# Patient Record
Sex: FEMALE | Race: WHITE | NOT HISPANIC OR LATINO | Employment: FULL TIME | URBAN - NONMETROPOLITAN AREA
[De-identification: names, ages, dates, MRNs, and addresses within clinical notes are randomized per-mention and may not be internally consistent; named-entity substitution may affect disease eponyms.]

---

## 2017-03-15 ENCOUNTER — HOSPITAL ENCOUNTER (EMERGENCY)
Facility: HOSPITAL | Age: 42
Discharge: HOME | End: 2017-03-15
Attending: FAMILY MEDICINE | Admitting: FAMILY MEDICINE

## 2017-03-15 VITALS
HEIGHT: 66 IN | RESPIRATION RATE: 18 BRPM | OXYGEN SATURATION: 97 % | TEMPERATURE: 97.9 F | BODY MASS INDEX: 40.18 KG/M2 | SYSTOLIC BLOOD PRESSURE: 111 MMHG | WEIGHT: 250 LBS | HEART RATE: 82 BPM | DIASTOLIC BLOOD PRESSURE: 75 MMHG

## 2017-03-15 DIAGNOSIS — G43.909 MIGRAINE WITHOUT STATUS MIGRAINOSUS, NOT INTRACTABLE, UNSPECIFIED MIGRAINE TYPE: Primary | ICD-10-CM

## 2017-03-15 DIAGNOSIS — R10.11 RIGHT UPPER QUADRANT ABDOMINAL PAIN: ICD-10-CM

## 2017-03-15 LAB
ALBUMIN SERPL-MCNC: 4.3 G/DL (ref 3.4–4.8)
ALBUMIN/GLOB SERPL: 1.3 G/DL (ref 1.1–1.8)
ALP SERPL-CCNC: 129 U/L (ref 38–126)
ALT SERPL W P-5'-P-CCNC: 103 U/L (ref 9–52)
ANION GAP SERPL CALCULATED.3IONS-SCNC: 11 MMOL/L (ref 5–15)
AST SERPL-CCNC: 151 U/L (ref 14–36)
BILIRUB SERPL-MCNC: 0.8 MG/DL (ref 0.2–1.3)
BUN BLD-MCNC: 10 MG/DL (ref 7–21)
BUN/CREAT SERPL: 25.6 (ref 7–25)
CALCIUM SPEC-SCNC: 8.8 MG/DL (ref 8.4–10.2)
CHLORIDE SERPL-SCNC: 99 MMOL/L (ref 95–110)
CO2 SERPL-SCNC: 23 MMOL/L (ref 22–31)
CREAT BLD-MCNC: 0.39 MG/DL (ref 0.5–1)
GFR SERPL CREATININE-BSD FRML MDRD: 183 ML/MIN/1.73 (ref 64–149)
GLOBULIN UR ELPH-MCNC: 3.4 GM/DL (ref 2.3–3.5)
GLUCOSE BLD-MCNC: 169 MG/DL (ref 60–100)
POTASSIUM BLD-SCNC: 4.2 MMOL/L (ref 3.5–5.1)
PROT SERPL-MCNC: 7.7 G/DL (ref 6.3–8.6)
SODIUM BLD-SCNC: 133 MMOL/L (ref 137–145)

## 2017-03-15 PROCEDURE — 96374 THER/PROPH/DIAG INJ IV PUSH: CPT

## 2017-03-15 PROCEDURE — 25010000002 KETOROLAC TROMETHAMINE PER 15 MG: Performed by: FAMILY MEDICINE

## 2017-03-15 PROCEDURE — 80053 COMPREHEN METABOLIC PANEL: CPT | Performed by: FAMILY MEDICINE

## 2017-03-15 PROCEDURE — 99283 EMERGENCY DEPT VISIT LOW MDM: CPT

## 2017-03-15 PROCEDURE — 96375 TX/PRO/DX INJ NEW DRUG ADDON: CPT

## 2017-03-15 PROCEDURE — 25010000002 PROMETHAZINE PER 50 MG: Performed by: FAMILY MEDICINE

## 2017-03-15 RX ORDER — PROMETHAZINE HYDROCHLORIDE 25 MG/ML
12.5 INJECTION, SOLUTION INTRAMUSCULAR; INTRAVENOUS ONCE
Status: COMPLETED | OUTPATIENT
Start: 2017-03-15 | End: 2017-03-15

## 2017-03-15 RX ORDER — SODIUM CHLORIDE 0.9 % (FLUSH) 0.9 %
10 SYRINGE (ML) INJECTION AS NEEDED
Status: DISCONTINUED | OUTPATIENT
Start: 2017-03-15 | End: 2017-03-16 | Stop reason: HOSPADM

## 2017-03-15 RX ORDER — KETOROLAC TROMETHAMINE 30 MG/ML
30 INJECTION, SOLUTION INTRAMUSCULAR; INTRAVENOUS ONCE
Status: COMPLETED | OUTPATIENT
Start: 2017-03-15 | End: 2017-03-15

## 2017-03-15 RX ORDER — SUMATRIPTAN 50 MG/1
50 TABLET, FILM COATED ORAL
COMMUNITY
End: 2017-12-20

## 2017-03-15 RX ORDER — ONDANSETRON 4 MG/1
4 TABLET, FILM COATED ORAL EVERY 8 HOURS PRN
Qty: 10 TABLET | Refills: 0 | Status: SHIPPED | OUTPATIENT
Start: 2017-03-15 | End: 2017-09-25

## 2017-03-15 RX ORDER — TRAMADOL HYDROCHLORIDE 50 MG/1
50 TABLET ORAL EVERY 8 HOURS PRN
Qty: 15 TABLET | Refills: 0 | Status: SHIPPED | OUTPATIENT
Start: 2017-03-15 | End: 2017-09-25

## 2017-03-15 RX ADMIN — PROMETHAZINE HYDROCHLORIDE 12.5 MG: 25 INJECTION, SOLUTION INTRAMUSCULAR; INTRAVENOUS at 19:43

## 2017-03-15 RX ADMIN — KETOROLAC TROMETHAMINE 30 MG: 30 INJECTION, SOLUTION INTRAMUSCULAR; INTRAVENOUS at 19:43

## 2017-03-15 NOTE — ED PROVIDER NOTES
Subjective   HPI Comments: Stain remover used at NH caused patient to develop nausea, headache, and abdominal pain. Pt states that she has h/o migraines.    Patient is a 41 y.o. female presenting with migraines.   Migraine   Pain location:  Generalized  Quality:  Sharp  Radiates to:  Does not radiate  Severity currently:  8/10  Severity at highest:  8/10  Onset quality:  Sudden  Duration:  16 hours  Timing:  Constant  Progression:  Waxing and waning  Chronicity:  New  Context comment:  Exposure to cleanser  Relieved by:  Nothing  Worsened by:  Nothing  Associated symptoms: abdominal pain, fatigue, nausea, near-syncope, neck stiffness, seizures, vomiting and weakness (generalized)    Associated symptoms: no back pain, no blurred vision, no congestion, no cough, no diarrhea, no dizziness, no drainage, no ear pain, no facial pain, no fever, no hearing loss, no loss of balance, no myalgias, no neck pain, no numbness, no sinus pressure, no sore throat, no swollen glands, no syncope, no tingling and no URI        Review of Systems   Constitutional: Positive for fatigue. Negative for appetite change, chills, diaphoresis and fever.   HENT: Negative for congestion, ear discharge, ear pain, hearing loss, nosebleeds, postnasal drip, rhinorrhea, sinus pressure, sore throat and trouble swallowing.    Eyes: Negative for blurred vision, discharge and redness.   Respiratory: Negative for apnea, cough, chest tightness, shortness of breath and wheezing.    Cardiovascular: Positive for near-syncope. Negative for chest pain and syncope.   Gastrointestinal: Positive for abdominal pain, nausea and vomiting. Negative for diarrhea.   Endocrine: Negative for polyuria.   Genitourinary: Negative for dysuria, frequency and urgency.   Musculoskeletal: Positive for neck stiffness. Negative for back pain, myalgias and neck pain.   Skin: Negative for color change and rash.   Allergic/Immunologic: Negative for immunocompromised state.    Neurological: Positive for seizures and weakness (generalized). Negative for dizziness, syncope, light-headedness, numbness, headaches and loss of balance.   Hematological: Negative for adenopathy. Does not bruise/bleed easily.   Psychiatric/Behavioral: Negative for behavioral problems and confusion.   All other systems reviewed and are negative.      Past Medical History   Diagnosis Date   • Fatty liver    • Migraine        Allergies   Allergen Reactions   • Caffeine        Past Surgical History   Procedure Laterality Date   • Cholecystectomy         History reviewed. No pertinent family history.    Social History     Social History   • Marital status:      Spouse name: N/A   • Number of children: N/A   • Years of education: N/A     Social History Main Topics   • Smoking status: Former Smoker   • Smokeless tobacco: None   • Alcohol use No   • Drug use: No   • Sexual activity: Defer     Other Topics Concern   • None     Social History Narrative   • None           Objective   Physical Exam   Constitutional: She is oriented to person, place, and time. She appears well-developed and well-nourished.   HENT:   Head: Normocephalic and atraumatic.   Nose: Nose normal.   Mouth/Throat: Oropharynx is clear and moist.   Eyes: Conjunctivae and EOM are normal. Pupils are equal, round, and reactive to light. Right eye exhibits no discharge. Left eye exhibits no discharge. No scleral icterus.   Neck: Normal range of motion. Neck supple. No tracheal deviation present.   Cardiovascular: Normal rate, regular rhythm and normal heart sounds.    No murmur heard.  Pulmonary/Chest: Effort normal and breath sounds normal. No stridor. No respiratory distress. She has no wheezes. She has no rales.   Abdominal: Soft. Bowel sounds are normal. She exhibits no distension and no mass. There is tenderness in the right upper quadrant. There is no rebound and no guarding.   Musculoskeletal: She exhibits no edema.   Neurological: She is  alert and oriented to person, place, and time. Coordination normal.   Skin: Skin is warm and dry. No rash noted. No erythema.   Psychiatric: She has a normal mood and affect. Her behavior is normal. Thought content normal.   Nursing note and vitals reviewed.      Procedures         ED Course  ED Course   Comment By Time   Request # : [id number] Yossi Mg MD 03/15 2201          Labs Reviewed   COMPREHENSIVE METABOLIC PANEL - Abnormal; Notable for the following:        Result Value    Glucose 169 (*)     Creatinine 0.39 (*)     Sodium 133 (*)     ALT (SGPT) 103 (*)     AST (SGOT) 151 (*)     Alkaline Phosphatase 129 (*)     eGFR Non  Amer 183 (*)     BUN/Creatinine Ratio 25.6 (*)     All other components within normal limits       No orders to display     Labs reviewed with patient, LFTs at baseline.            MDM    Final diagnoses:   Migraine without status migrainosus, not intractable, unspecified migraine type   Right upper quadrant abdominal pain            Yossi Mg MD  03/15/17 [id number]

## 2017-09-29 ENCOUNTER — TELEPHONE (OUTPATIENT)
Dept: FAMILY MEDICINE CLINIC | Facility: CLINIC | Age: 42
End: 2017-09-29

## 2018-11-09 PROCEDURE — 87480 CANDIDA DNA DIR PROBE: CPT | Performed by: NURSE PRACTITIONER

## 2018-11-09 PROCEDURE — 87660 TRICHOMONAS VAGIN DIR PROBE: CPT | Performed by: NURSE PRACTITIONER

## 2018-11-09 PROCEDURE — 87510 GARDNER VAG DNA DIR PROBE: CPT | Performed by: NURSE PRACTITIONER

## 2018-11-09 PROCEDURE — 87086 URINE CULTURE/COLONY COUNT: CPT | Performed by: NURSE PRACTITIONER

## 2020-02-04 ENCOUNTER — OFFICE VISIT (OUTPATIENT)
Dept: ENDOCRINOLOGY | Facility: CLINIC | Age: 45
End: 2020-02-04

## 2020-02-04 VITALS
SYSTOLIC BLOOD PRESSURE: 124 MMHG | OXYGEN SATURATION: 98 % | HEIGHT: 66 IN | BODY MASS INDEX: 39.71 KG/M2 | WEIGHT: 247.1 LBS | HEART RATE: 110 BPM | DIASTOLIC BLOOD PRESSURE: 72 MMHG

## 2020-02-04 DIAGNOSIS — E06.3 HYPOTHYROIDISM DUE TO HASHIMOTO'S THYROIDITIS: ICD-10-CM

## 2020-02-04 DIAGNOSIS — E11.59 HYPERTENSION ASSOCIATED WITH DIABETES (HCC): ICD-10-CM

## 2020-02-04 DIAGNOSIS — E11.69 MIXED DIABETIC HYPERLIPIDEMIA ASSOCIATED WITH TYPE 2 DIABETES MELLITUS (HCC): ICD-10-CM

## 2020-02-04 DIAGNOSIS — E78.2 MIXED DIABETIC HYPERLIPIDEMIA ASSOCIATED WITH TYPE 2 DIABETES MELLITUS (HCC): ICD-10-CM

## 2020-02-04 DIAGNOSIS — I15.2 HYPERTENSION ASSOCIATED WITH DIABETES (HCC): ICD-10-CM

## 2020-02-04 DIAGNOSIS — Z79.4 TYPE 2 DIABETES MELLITUS WITH HYPERGLYCEMIA, WITH LONG-TERM CURRENT USE OF INSULIN (HCC): Primary | ICD-10-CM

## 2020-02-04 DIAGNOSIS — E03.8 HYPOTHYROIDISM DUE TO HASHIMOTO'S THYROIDITIS: ICD-10-CM

## 2020-02-04 DIAGNOSIS — E11.65 TYPE 2 DIABETES MELLITUS WITH HYPERGLYCEMIA, WITH LONG-TERM CURRENT USE OF INSULIN (HCC): Primary | ICD-10-CM

## 2020-02-04 PROCEDURE — 99204 OFFICE O/P NEW MOD 45 MIN: CPT | Performed by: INTERNAL MEDICINE

## 2020-02-04 RX ORDER — ATORVASTATIN CALCIUM 40 MG/1
40 TABLET, FILM COATED ORAL DAILY
COMMUNITY

## 2020-02-04 RX ORDER — VITAMIN E 268 MG
400 CAPSULE ORAL DAILY
COMMUNITY

## 2020-02-04 RX ORDER — INSULIN GLARGINE 100 [IU]/ML
INJECTION, SOLUTION SUBCUTANEOUS
Qty: 2 PEN | Refills: 11 | Status: SHIPPED | OUTPATIENT
Start: 2020-02-04 | End: 2020-07-28 | Stop reason: SDUPTHER

## 2020-02-04 NOTE — PROGRESS NOTES
" Diane Esquivel is a 44 y.o. female who presents for  evaluation of   Chief Complaint   Patient presents with   • Diabetes     bs 175 this am pizza last night       Referring provider    Primary Care Provider    Mushtaq Mar MD    Duration 10 years    Timing - Diabetes is Constant    Quality -  reasonably well controlled    Severity -  moderate    Complications - none    Current symptoms/problems  none     Alleviating Factors: Compliance      Side Effects  none    Current diet  in general, a \"healthy\" diet      Current exercise none    Current monitoring regimen: home blood tests - checking 2 x daily   Home blood sugar records: 100-200    Hypoglycemia none    Past Medical History:   Diagnosis Date   • Abnormal liver enzymes    • Acute serous otitis media    • Adult body mass index 30+     obesity   • Dysfunctional uterine bleeding    • Fatty liver    • Hypercholesteremia    • Hypertension associated with diabetes (CMS/[facility]) 2/4/2020   • Hypothyroidism due to Hashimoto's thyroiditis 2/4/2020   • Migraine    • Migraine     tension type headache vs migraine      • Mixed diabetic hyperlipidemia associated with type 2 diabetes mellitus (CMS/[facility]) 2/4/2020   • Morbid (severe) obesity due to excess calories (CMS/[facility])     BMI 41   • Nausea, vomiting and diarrhea    • Non-alcoholic fatty liver disease    • Type 2 diabetes mellitus (CMS/[facility])     A1C 7.4   • Type 2 diabetes mellitus with hyperglycemia, with long-term current use of insulin (CMS/[facility]) 2/4/2020     No family history on file.  Social History     Tobacco Use   • Smoking status: Never Smoker   • Smokeless tobacco: Never Used   Substance Use Topics   • Alcohol use: No   • Drug use: No         Current Outpatient Medications:   •  atorvastatin (LIPITOR) 40 MG tablet, Take 40 mg by mouth Daily., Disp: , Rfl:   •  Dulaglutide (TRULICITY) 0.75 MG/0.5ML solution pen-injector, Inject 0.75 mg under the skin into the appropriate area as directed Every 7 (Seven) Days., " Disp: 4 pen, Rfl: 11  •  levothyroxine (SYNTHROID, LEVOTHROID) 50 MCG tablet, 75 mcg., Disp: , Rfl:   •  ondansetron ODT (ZOFRAN-ODT) 4 MG disintegrating tablet, Take 1 tablet by mouth Every 8 (Eight) Hours As Needed for Nausea or Vomiting for up to 8 doses., Disp: 8 tablet, Rfl: 0  •  ONE TOUCH ULTRA TEST test strip, , Disp: , Rfl:   •  vitamin E 400 UNIT capsule, Take 400 Units by mouth Daily., Disp: , Rfl:   •  Empagliflozin-metFORMIN HCl ER (SYNJARDY XR) 5-1000 MG tablet sustained-release 24 hour, Take 2 tablet/day by mouth Daily With Breakfast., Disp: 60 tablet, Rfl: 11  •  Insulin Glargine (BASAGLAR KWIKPEN) 100 UNIT/ML injection pen, 20 units qhs, Disp: 2 pen, Rfl: 11    Review of Systems    Review of Systems   Constitutional: Negative for activity change, appetite change, chills, diaphoresis, fatigue, fever and unexpected weight change.   HENT: Negative for congestion, dental problem, drooling, ear discharge, ear pain, facial swelling, mouth sores, postnasal drip, rhinorrhea, sinus pressure, sore throat, tinnitus, trouble swallowing and voice change.    Eyes: Negative for photophobia, pain, discharge, redness, itching and visual disturbance.   Respiratory: Negative for apnea, cough, choking, chest tightness, shortness of breath, wheezing and stridor.    Cardiovascular: Negative for chest pain, palpitations and leg swelling.   Gastrointestinal: Negative for abdominal distention, abdominal pain, constipation, diarrhea, nausea and vomiting.   Endocrine: Negative for cold intolerance, heat intolerance, polydipsia, polyphagia and polyuria.   Genitourinary: Negative for decreased urine volume, difficulty urinating, dysuria, flank pain, frequency, hematuria and urgency.   Musculoskeletal: Negative for arthralgias, back pain, gait problem, joint swelling, myalgias, neck pain and neck stiffness.   Skin: Negative for color change, pallor, rash and wound.   Allergic/Immunologic: Negative for immunocompromised state.  "  Neurological: Negative for dizziness, tremors, seizures, syncope, facial asymmetry, speech difficulty, weakness, light-headedness, numbness and headaches.   Hematological: Negative for adenopathy.   Psychiatric/Behavioral: Negative for agitation, behavioral problems, confusion, decreased concentration, dysphoric mood, hallucinations, self-injury, sleep disturbance and suicidal ideas. The patient is not nervous/anxious and is not hyperactive.         Objective:   /72   Pulse 110   Ht 167.6 cm (66\")   Wt 112 kg (247 lb 1.6 oz)   SpO2 98%   BMI 39.88 kg/m²     Physical Exam   Constitutional: She is oriented to person, place, and time. She appears well-developed.   HENT:   Head: Normocephalic.   Right Ear: External ear normal.   Left Ear: External ear normal.   Nose: Nose normal.   Eyes: Conjunctivae and EOM are normal. No scleral icterus.   Neck: Normal range of motion. Neck supple. No tracheal deviation present. No thyromegaly present.   Cardiovascular: Normal rate, regular rhythm, normal heart sounds and intact distal pulses. Exam reveals no gallop and no friction rub.   No murmur heard.  Pulmonary/Chest: Effort normal and breath sounds normal. No stridor. No respiratory distress. She has no wheezes. She has no rales. She exhibits no tenderness.   Abdominal: Soft. Bowel sounds are normal. She exhibits no distension and no mass. There is no tenderness. There is no rebound and no guarding.   Musculoskeletal: Normal range of motion. She exhibits no tenderness or deformity.   Lymphadenopathy:     She has no cervical adenopathy.   Neurological: She is alert and oriented to person, place, and time. She displays normal reflexes. She exhibits normal muscle tone. Coordination normal.   Skin: No rash noted. No erythema. No pallor.   Psychiatric: She has a normal mood and affect. Her behavior is normal. Judgment and thought content normal.       Lab Review    Results for orders placed or performed during the " hospital encounter of 11/09/18   Gardnerella vaginalis, Trichomonas vaginalis, Candida albicans, PCR - Swab, Vagina   Result Value Ref Range    CANDIDA ALBICANS Negative     GARDNERELLA VAGINALIS Negative     Trichomonas vaginalis PCR Negative    Urine Culture - Urine, Urine, Clean Catch   Result Value Ref Range    Urine Culture No growth at 24 hours    POC Urinalysis Dipstick, Multipro (Automated dipstick)   Result Value Ref Range    Color Yellow Yellow, Straw, Dark Yellow, Marilee    Clarity, UA Cloudy (A) Clear    Glucose,  mg/dL (A) Negative, 1000 mg/dL (3+) mg/dL    Bilirubin Negative Negative    Ketones, UA Negative Negative    Specific Gravity  1.030 1.005 - 1.030    Blood, UA 1+ (A) Negative    pH, Urine 6.0 5.0 - 8.0    Protein, POC Trace (A) Negative mg/dL    Urobilinogen, UA Normal Normal    Nitrite, UA Negative Negative    Leukocytes Negative Negative   POCT Urine Micro   Result Value Ref Range    WBC, UA 0-2 (A) None Seen /HPF    RBC, UA 3-5 (A) None Seen /HPF    Bacteria, UA Trace (A) None Seen /HPF         Assessment/Plan       ICD-10-CM ICD-9-CM   1. Type 2 diabetes mellitus with hyperglycemia, with long-term current use of insulin (CMS/[facility]) E11.65 250.00    Z79.4 790.29     V58.67   2. Hypertension associated with diabetes (CMS/[facility]) E11.59 250.80    I10 401.9   3. Mixed diabetic hyperlipidemia associated with type 2 diabetes mellitus (CMS/[facility]) E11.69 250.80    E78.2 272.2   4. Hypothyroidism due to Hashimoto's thyroiditis E03.8 244.8    E06.3 245.2         I reviewed and summarized records from Mushtaq Mar MD from current year  and I reviewed / ordered labs.   From review of records :    Pt has type 2 diabetes with hyperglycemia      Glycemic Management:   Lab Results   Component Value Date    HGBA1C 7.4 (H) 01/11/2016     Lab Results   Component Value Date    GLUCOSE 169 (H) 03/15/2017    BUN 10 03/15/2017    CREATININE 0.39 (L) 03/15/2017    EGFRIFNONA 183 (H) 03/15/2017    BCR 25.6 (H)  03/15/2017    K 4.2 03/15/2017    CO2 23.0 03/15/2017    CALCIUM 8.8 03/15/2017    ALBUMIN 4.30 03/15/2017     (H) 03/15/2017     (H) 03/15/2017    ANIONGAP 11.0 03/15/2017     Lab Results   Component Value Date    WBC 7.7 01/11/2016    HGB 12.5 01/11/2016    HCT 37.2 01/11/2016    MCV 83.8 01/11/2016     01/11/2016     basaglar 15 -- decrease to 10 ( gave toujeo samples )    Keep trulicity 0.75 , consider increasing but nauseous , has migraines that aggravates nausea    Add synjardy    Stop glipizide and januiva       Lipid Management  No results found for: CHOL  Lab Results   Component Value Date    TRIG 121 01/11/2016     Lab Results   Component Value Date    HDL 38 (L) 01/11/2016     No components found for: LDLCALC  Lab Results   Component Value Date     (H) 01/11/2016     No results found for: LDLDIRECT    lipitor 40 mg qhs      Blood Pressure Management:    Vitals:    02/04/20 0900   BP: 124/72   Pulse: 110   SpO2: 98%     Lab Results   Component Value Date    GLUCOSE 169 (H) 03/15/2017    CALCIUM 8.8 03/15/2017     (L) 03/15/2017    K 4.2 03/15/2017    CO2 23.0 03/15/2017    CL 99 03/15/2017    BUN 10 03/15/2017    CREATININE 0.39 (L) 03/15/2017    EGFRIFNONA 183 (H) 03/15/2017    BCR 25.6 (H) 03/15/2017    ANIONGAP 11.0 03/15/2017     At goal           Microvascular Complication Monitoring:      Eye Exam Evaluation    -----------    Last Microalbumin-Proteinuria Assessment    No results found for: MALBCRERATIO    No results found for: UTPCR    -----------      Neuropathy      Immunizations:          Preventive Care:        Weight Related:   Wt Readings from Last 3 Encounters:   02/04/20 112 kg (247 lb 1.6 oz)   11/09/18 114 kg (251 lb 6.4 oz)   08/18/18 111 kg (245 lb)     Body mass index is 39.88 kg/m².        Diet interventions: moderate (500 kCal/d) deficit diet.      Bone Health    Lab Results   Component Value Date    CALCIUM 8.8 03/15/2017    [id number] 16 (L)  02/24/2014       Thyroid Health    Lab Results   Component Value Date    TSH 5.01 (H) 01/11/2016    TSH 7.13 (H) 02/24/2014       Lab Results   Component Value Date    FREET4 0.70 (L) 01/11/2016    FREET4 0.79 02/24/2014       On levothyroxine 100           Other Diabetes Related Aspects       No results found for: [id number]         No orders of the defined types were placed in this encounter.        A copy of my note was sent to Mushtaq Mar MD    Please see my above opinion and suggestions.

## 2020-02-05 ENCOUNTER — TELEPHONE (OUTPATIENT)
Dept: ENDOCRINOLOGY | Facility: CLINIC | Age: 45
End: 2020-02-05

## 2020-02-05 ENCOUNTER — APPOINTMENT (OUTPATIENT)
Dept: CT IMAGING | Facility: HOSPITAL | Age: 45
End: 2020-02-05

## 2020-02-05 NOTE — TELEPHONE ENCOUNTER
RICO FERGUSON Key: [id number] - Rx #: [id number] Need help? Call us at [phone]   Status   Sent to [facility] XR 5-1000MG er tablets   Form[employer] Electronic PA Form (NCPDP)   Original Claim [id number]

## 2020-02-06 ENCOUNTER — TELEPHONE (OUTPATIENT)
Dept: ENDOCRINOLOGY | Facility: CLINIC | Age: 45
End: 2020-02-06

## 2020-03-03 ENCOUNTER — HOSPITAL ENCOUNTER (OUTPATIENT)
Dept: CT IMAGING | Facility: HOSPITAL | Age: 45
Discharge: HOME | End: 2020-03-03
Admitting: FAMILY MEDICINE

## 2020-03-03 DIAGNOSIS — G43.009 MIGRAINE WITHOUT AURA AND WITHOUT STATUS MIGRAINOSUS, NOT INTRACTABLE: ICD-10-CM

## 2020-03-03 PROCEDURE — 70470 CT HEAD/BRAIN W/O & W/DYE: CPT

## 2020-03-03 PROCEDURE — 25010000002 IOPAMIDOL 61 % SOLUTION: Performed by: FAMILY MEDICINE

## 2020-03-03 RX ADMIN — IOPAMIDOL 90 ML: 612 INJECTION, SOLUTION INTRAVENOUS at 09:46

## 2020-03-16 ENCOUNTER — OFFICE VISIT (OUTPATIENT)
Dept: ENDOCRINOLOGY | Facility: CLINIC | Age: 45
End: 2020-03-16

## 2020-03-16 VITALS
BODY MASS INDEX: 40.45 KG/M2 | OXYGEN SATURATION: 100 % | HEIGHT: 66 IN | SYSTOLIC BLOOD PRESSURE: 124 MMHG | HEART RATE: 85 BPM | DIASTOLIC BLOOD PRESSURE: 74 MMHG | WEIGHT: 251.7 LBS

## 2020-03-16 DIAGNOSIS — E03.8 HYPOTHYROIDISM DUE TO HASHIMOTO'S THYROIDITIS: ICD-10-CM

## 2020-03-16 DIAGNOSIS — E11.65 TYPE 2 DIABETES MELLITUS WITH HYPERGLYCEMIA, WITH LONG-TERM CURRENT USE OF INSULIN (HCC): ICD-10-CM

## 2020-03-16 DIAGNOSIS — E11.69 MIXED DIABETIC HYPERLIPIDEMIA ASSOCIATED WITH TYPE 2 DIABETES MELLITUS (HCC): ICD-10-CM

## 2020-03-16 DIAGNOSIS — E78.2 MIXED DIABETIC HYPERLIPIDEMIA ASSOCIATED WITH TYPE 2 DIABETES MELLITUS (HCC): ICD-10-CM

## 2020-03-16 DIAGNOSIS — I15.2 HYPERTENSION ASSOCIATED WITH DIABETES (HCC): Primary | ICD-10-CM

## 2020-03-16 DIAGNOSIS — Z79.4 TYPE 2 DIABETES MELLITUS WITH HYPERGLYCEMIA, WITH LONG-TERM CURRENT USE OF INSULIN (HCC): ICD-10-CM

## 2020-03-16 DIAGNOSIS — E11.59 HYPERTENSION ASSOCIATED WITH DIABETES (HCC): Primary | ICD-10-CM

## 2020-03-16 DIAGNOSIS — E06.3 HYPOTHYROIDISM DUE TO HASHIMOTO'S THYROIDITIS: ICD-10-CM

## 2020-03-16 LAB — HBA1C MFR BLD: 8.9 %

## 2020-03-16 PROCEDURE — 99214 OFFICE O/P EST MOD 30 MIN: CPT | Performed by: NURSE PRACTITIONER

## 2020-03-16 RX ORDER — SUMATRIPTAN 50 MG/1
50 TABLET, FILM COATED ORAL DAILY
COMMUNITY
Start: 2020-03-06

## 2020-03-16 RX ORDER — AMITRIPTYLINE HYDROCHLORIDE 50 MG/1
50 TABLET, FILM COATED ORAL DAILY
COMMUNITY
Start: 2020-03-06

## 2020-03-16 RX ORDER — PIOGLITAZONEHYDROCHLORIDE 15 MG/1
15 TABLET ORAL DAILY
Qty: 30 TABLET | Refills: 11 | Status: SHIPPED | OUTPATIENT
Start: 2020-03-16 | End: 2021-03-16

## 2020-03-16 NOTE — PROGRESS NOTES
"  Subjective    Diane Esquivel is a 44 y.o. female. she is here today for follow-up.    History of Present Illness         Referring provider     Primary Care Provider     Mushtaq Mar MD    Reason - diabetes      Duration 10 years     Timing - Diabetes is Constant     Quality -  not controlled     Severity -  moderate     Complications - none     Current symptoms/problems  none      Alleviating Factors: Compliance       Side Effects  none     Current diet  in general, a \"healthy\" diet       Current exercise none     Current monitoring regimen: home blood tests - checking 2 x daily     Lab Results   Component Value Date    HGBA1C 8.9 09/17/2019       Home blood sugar records:       States 200 or higher          Hypoglycemia none             The following portions of the patient's history were reviewed and updated as appropriate:   Past Medical History:   Diagnosis Date   • Abnormal liver enzymes    • Acute serous otitis media    • Adult body mass index 30+     obesity   • Dysfunctional uterine bleeding    • Fatty liver    • Hypercholesteremia    • Hypertension associated with diabetes (CMS/HCC) 2/4/2020   • Hypothyroidism due to Hashimoto's thyroiditis 2/4/2020   • Migraine    • Migraine     tension type headache vs migraine      • Mixed diabetic hyperlipidemia associated with type 2 diabetes mellitus (CMS/[facility]) 2/4/2020   • Morbid (severe) obesity due to excess calories (CMS/[facility])     BMI 41   • Nausea, vomiting and diarrhea    • Non-alcoholic fatty liver disease    • Type 2 diabetes mellitus (CMS/[facility])     A1C 7.4   • Type 2 diabetes mellitus with hyperglycemia, with long-term current use of insulin (CMS/[facility]) 2/4/2020     Past Surgical History:   Procedure Laterality Date   • CHOLECYSTECTOMY     • INJECTION OF MEDICATION  02/18/2014    Phenergan (2)      • INJECTION OF MEDICATION  02/24/2014    Toradol (2)    • INJECTION OF MEDICATION  03/09/2014    Zofran (2)        No family history on file.  OB History  "   None       Current Outpatient Medications   Medication Sig Dispense Refill   • amitriptyline (ELAVIL) 50 MG tablet Take 50 mg by mouth Daily.     • atorvastatin (LIPITOR) 40 MG tablet Take 40 mg by mouth Daily.     • Dulaglutide (TRULICITY) 0.75 MG/0.5ML solution pen-injector Inject 0.75 mg under the skin into the appropriate area as directed Every 7 (Seven) Days. 4 pen 11   • Empagliflozin-metFORMIN HCl ER (SYNJARDY XR) 5-1000 MG tablet sustained-release 24 hour Take 2 tablet/day by mouth Daily With Breakfast. 60 tablet 11   • Insulin Glargine (BASAGLAR KWIKPEN) 100 UNIT/ML injection pen 20 units qhs 2 pen 11   • levothyroxine (SYNTHROID, LEVOTHROID) 50 MCG tablet 75 mcg.     • ondansetron ODT (ZOFRAN-ODT) 4 MG disintegrating tablet Take 1 tablet by mouth Every 8 (Eight) Hours As Needed for Nausea or Vomiting for up to 8 doses. 8 tablet 0   • ONE TOUCH ULTRA TEST test strip      • vitamin E 400 UNIT capsule Take 400 Units by mouth Daily.     • pioglitazone (ACTOS) 15 MG tablet Take 1 tablet by mouth Daily. 30 tablet 11   • SUMAtriptan (IMITREX) 50 MG tablet Take 50 mg by mouth Daily.       No current facility-administered medications for this visit.      Allergies   Allergen Reactions   • Caffeine      Social History     Socioeconomic History   • Marital status:      Spouse name: Not on file   • Number of children: Not on file   • Years of education: Not on file   • Highest education level: Not on file   Tobacco Use   • Smoking status: Never Smoker   • Smokeless tobacco: Never Used   Substance and Sexual Activity   • Alcohol use: No   • Drug use: No   • Sexual activity: Defer       Review of Systems  Review of Systems   Constitutional: Negative for activity change, appetite change, diaphoresis and fatigue.   HENT: Negative for facial swelling, sneezing, sore throat, tinnitus, trouble swallowing and voice change.    Eyes: Negative for photophobia, pain, discharge, redness, itching and visual disturbance.  "  Respiratory: Negative for apnea, cough, choking, chest tightness and shortness of breath.    Cardiovascular: Negative for chest pain, palpitations and leg swelling.   Gastrointestinal: Negative for abdominal distention, abdominal pain, constipation, diarrhea, nausea and vomiting.   Endocrine: Negative for cold intolerance, heat intolerance, polydipsia, polyphagia and polyuria.   Genitourinary: Negative for difficulty urinating, dysuria, frequency, hematuria and urgency.   Musculoskeletal: Negative for arthralgias, back pain, gait problem, joint swelling, myalgias, neck pain and neck stiffness.   Skin: Negative for color change, pallor, rash and wound.   Neurological: Negative for dizziness, tremors, weakness, light-headedness, numbness and headaches.   Hematological: Negative for adenopathy. Does not bruise/bleed easily.   Psychiatric/Behavioral: Negative for behavioral problems, confusion and sleep disturbance.        Objective    /74   Pulse 85   Ht 167.6 cm (66\")   Wt 114 kg (251 lb 11.2 oz)   SpO2 100%   BMI 40.63 kg/m²   Physical Exam   Constitutional: She is oriented to person, place, and time. She appears well-developed and well-nourished. No distress.   HENT:   Head: Normocephalic and atraumatic.   Right Ear: External ear normal.   Left Ear: External ear normal.   Nose: Nose normal.   Eyes: Pupils are equal, round, and reactive to light. Conjunctivae and EOM are normal.   Neck: Normal range of motion. Neck supple. No tracheal deviation present. No thyromegaly present.   Cardiovascular: Normal rate, regular rhythm and normal heart sounds.   No murmur heard.  Pulmonary/Chest: Effort normal and breath sounds normal. No respiratory distress. She has no wheezes.   Abdominal: Soft. Bowel sounds are normal. There is no tenderness. There is no rebound and no guarding.   Musculoskeletal: Normal range of motion. She exhibits no edema, tenderness or deformity.   Neurological: She is alert and oriented to " person, place, and time. No cranial nerve deficit.   Skin: Skin is warm and dry. No rash noted.   Psychiatric: She has a normal mood and affect. Her behavior is normal. Judgment and thought content normal.       Lab Review  Glucose   Date Value   03/15/2017 169 mg/dL (H)   01/11/2016 181 mg/dl (H)   11/30/2015 127 mg/dl (H)     Sodium (mmol/L)   Date Value   03/15/2017 133 (L)   01/11/2016 136 (L)   11/30/2015 137     Potassium (mmol/L)   Date Value   03/15/2017 4.2   01/11/2016 4.2   11/30/2015 3.8     Chloride (mmol/L)   Date Value   03/15/2017 99   01/11/2016 101   11/30/2015 99     CO2 (mmol/L)   Date Value   03/15/2017 23.0   01/11/2016 23   11/30/2015 25     BUN   Date Value   03/15/2017 10 mg/dL   01/11/2016 14 mg/dl   11/30/2015 12 mg/dl     Creatinine   Date Value   03/15/2017 0.39 mg/dL (L)   01/11/2016 0.4 mg/dl (L)   11/30/2015 0.5 mg/dl     Hemoglobin A1C   Date Value   09/17/2019 8.9   01/11/2016 7.4 %TotHgb (H)     Triglycerides (mg/dl)   Date Value   01/11/2016 121     LDL Cholesterol  (mg/dl)   Date Value   01/11/2016 155 (H)       Assessment/Plan      1. Hypertension associated with diabetes (CMS/[facility])    2. Mixed diabetic hyperlipidemia associated with type 2 diabetes mellitus (CMS/[facility])    3. Type 2 diabetes mellitus with hyperglycemia, with long-term current use of insulin (CMS/[facility])    4. Hypothyroidism due to Hashimoto's thyroiditis    .    Medications prescribed:  Outpatient Encounter Medications as of 3/16/2020   Medication Sig Dispense Refill   • amitriptyline (ELAVIL) 50 MG tablet Take 50 mg by mouth Daily.     • atorvastatin (LIPITOR) 40 MG tablet Take 40 mg by mouth Daily.     • Dulaglutide (TRULICITY) 0.75 MG/0.5ML solution pen-injector Inject 0.75 mg under the skin into the appropriate area as directed Every 7 (Seven) Days. 4 pen 11   • Empagliflozin-metFORMIN HCl ER (SYNJARDY XR) 5-1000 MG tablet sustained-release 24 hour Take 2 tablet/day by mouth Daily With Breakfast. 60 tablet 11   •  Insulin Glargine (BASAGLAR KWIKPEN) 100 UNIT/ML injection pen 20 units qhs 2 pen 11   • levothyroxine (SYNTHROID, LEVOTHROID) 50 MCG tablet 75 mcg.     • ondansetron ODT (ZOFRAN-ODT) 4 MG disintegrating tablet Take 1 tablet by mouth Every 8 (Eight) Hours As Needed for Nausea or Vomiting for up to 8 doses. 8 tablet 0   • ONE TOUCH ULTRA TEST test strip      • vitamin E 400 UNIT capsule Take 400 Units by mouth Daily.     • pioglitazone (ACTOS) 15 MG tablet Take 1 tablet by mouth Daily. 30 tablet 11   • SUMAtriptan (IMITREX) 50 MG tablet Take 50 mg by mouth Daily.       No facility-administered encounter medications on file as of 3/16/2020.        Orders placed during this encounter include:  Orders Placed This Encounter   Procedures   • Hemoglobin A1c     This order was created through External Result Entry   • Comprehensive Metabolic Panel   • Hemoglobin A1c   • Lipid Panel   • Protein / Creatinine Ratio, Urine - Urine, Clean Catch   • Microalbumin / Creatinine Urine Ratio - Urine, Clean Catch   • TSH   • Vitamin D 25 Hydroxy   • Vitamin B12   • CBC & Differential     Order Specific Question:   Manual Differential     Answer:   No     Pt has type 2 diabetes with hyperglycemia        Glycemic Management:     Lab Results   Component Value Date    HGBA1C 8.9 09/17/2019                Toujeo 10 units ---increase to 14 units          trulicity 0.75 mg weekly  , consider increasing but nauseous , has migraines that aggravates nausea              Synjardy makes sick had to stop       Try Actos 15 mg daily due to fatty liver     Watch fluid accumulation       Jardiance 10 mg once daily       Humalog sliding scale for rescue    Blood sugar   Insulin     200-250  2 units  251-300  3 units  301-350  4 units   Above 351  5 units        Stop glipizide and januiva         Lipid Management          Lab Results   Component Value Date     TRIG 121 01/11/2016            Lab Results   Component Value Date     HDL 38 (L) 01/11/2016               Lab Results   Component Value Date      (H) 01/11/2016        lipitor 40 mg qhs        Blood Pressure Management:       At goal               Microvascular Complication Monitoring:       Eye Exam Evaluation----over due -- will schedule on her own      -----------     Last Microalbumin-Proteinuria Assessment          -----------        Neuropathy        Immunizations:             Preventive Care:          Weight Related:       Patient's Body mass index is 40.63 kg/m². BMI is above normal parameters. Recommendations include: nutrition counseling.    Decrease daily caloric intake by 500 calories per day          Bone Health      Lab Results   Component Value Date    CALCIUM 8.8 03/15/2017             Thyroid Health      Lab Results   Component Value Date    TSH 5.01 (H) 01/11/2016             On levothyroxine 100               Other Diabetes Related Aspects         No results found for: [id number]            4. Follow-up: Return in about 6 weeks (around 4/27/2020) for Recheck.

## 2020-04-27 ENCOUNTER — TELEMEDICINE (OUTPATIENT)
Dept: ENDOCRINOLOGY | Facility: CLINIC | Age: 45
End: 2020-04-27

## 2020-04-27 DIAGNOSIS — Z79.4 TYPE 2 DIABETES MELLITUS WITH HYPERGLYCEMIA, WITH LONG-TERM CURRENT USE OF INSULIN (HCC): Primary | ICD-10-CM

## 2020-04-27 DIAGNOSIS — E11.65 TYPE 2 DIABETES MELLITUS WITH HYPERGLYCEMIA, WITH LONG-TERM CURRENT USE OF INSULIN (HCC): Primary | ICD-10-CM

## 2020-04-27 DIAGNOSIS — E03.9 ACQUIRED HYPOTHYROIDISM: ICD-10-CM

## 2020-04-27 PROCEDURE — 99214 OFFICE O/P EST MOD 30 MIN: CPT | Performed by: NURSE PRACTITIONER

## 2020-04-27 NOTE — PROGRESS NOTES
"  Subjective    Diane Esquivel is a 44 y.o. female. she is here for follow up     History of Present Illness         You have chosen to receive care through a telehealth visit.  Do you consent to use a video/audio connection for your medical care today? Yes        TELEHEALTH VIDEO VISIT     This a video visit due to [facility] current guidelines for social distancing due to the COVID 19 pandemic          Referring provider     Primary Care Provider     Mushtaq Mar MD     Reason - diabetes      Duration 10 years     Timing - Diabetes is Constant     Quality -  improved control      Severity -  moderate     Complications - none     Current symptoms/problems  none      Alleviating Factors: Compliance       Side Effects  none     Current diet  in general, a \"healthy\" diet       Current exercise none     Current monitoring regimen: home blood tests - checking 2 x daily            Lab Results   Component Value Date     HGBA1C 8.9 09/17/2019         Home blood sugar records:         States still seeing some 200s       The humalog is helping     Am fasting 150 and above         Hypoglycemia none            The following portions of the patient's history were reviewed and updated as appropriate:   Past Medical History:   Diagnosis Date   • Abnormal liver enzymes    • Acute serous otitis media    • Adult body mass index 30+     obesity   • Dysfunctional uterine bleeding    • Fatty liver    • Hypercholesteremia    • Hypertension associated with diabetes (CMS/HCC) 2/4/2020   • Hypothyroidism due to Hashimoto's thyroiditis 2/4/2020   • Migraine    • Migraine     tension type headache vs migraine      • Mixed diabetic hyperlipidemia associated with type 2 diabetes mellitus (CMS/HCC) 2/4/2020   • Morbid (severe) obesity due to excess calories (CMS/[facility])     BMI 41   • Nausea, vomiting and diarrhea    • Non-alcoholic fatty liver disease    • Type 2 diabetes mellitus (CMS/[facility])     A1C 7.4   • Type 2 diabetes mellitus with " hyperglycemia, with long-term current use of insulin (CMS/[facility]) 2/4/2020     Past Surgical History:   Procedure Laterality Date   • CHOLECYSTECTOMY     • INJECTION OF MEDICATION  02/18/2014    Phenergan (2)      • INJECTION OF MEDICATION  02/24/2014    Toradol (2)    • INJECTION OF MEDICATION  03/09/2014    Zofran (2)        History reviewed. No pertinent family history.  OB History    None       Current Outpatient Medications   Medication Sig Dispense Refill   • amitriptyline (ELAVIL) 50 MG tablet Take 50 mg by mouth Daily.     • atorvastatin (LIPITOR) 40 MG tablet Take 40 mg by mouth Daily.     • Dulaglutide (TRULICITY) 0.75 MG/0.5ML solution pen-injector Inject 0.75 mg under the skin into the appropriate area as directed Every 7 (Seven) Days. 4 pen 11   • Empagliflozin-metFORMIN HCl ER (SYNJARDY XR) 5-1000 MG tablet sustained-release 24 hour Take 2 tablet/day by mouth Daily With Breakfast. 60 tablet 11   • Insulin Glargine (BASAGLAR KWIKPEN) 100 UNIT/ML injection pen 20 units qhs 2 pen 11   • levothyroxine (SYNTHROID, LEVOTHROID) 50 MCG tablet 75 mcg.     • ondansetron ODT (ZOFRAN-ODT) 4 MG disintegrating tablet Take 1 tablet by mouth Every 8 (Eight) Hours As Needed for Nausea or Vomiting for up to 8 doses. 8 tablet 0   • ONE TOUCH ULTRA TEST test strip      • pioglitazone (ACTOS) 15 MG tablet Take 1 tablet by mouth Daily. 30 tablet 11   • SUMAtriptan (IMITREX) 50 MG tablet Take 50 mg by mouth Daily.     • vitamin E 400 UNIT capsule Take 400 Units by mouth Daily.       No current facility-administered medications for this visit.      Allergies   Allergen Reactions   • Caffeine      Social History     Socioeconomic History   • Marital status:      Spouse name: Not on file   • Number of children: Not on file   • Years of education: Not on file   • Highest education level: Not on file   Tobacco Use   • Smoking status: Never Smoker   • Smokeless tobacco: Never Used   Substance and Sexual Activity   • Alcohol  use: No   • Drug use: No   • Sexual activity: Defer       Review of Systems  Review of Systems   Constitutional: Negative for activity change, appetite change, diaphoresis and fatigue.   HENT: Negative for facial swelling, sneezing, sore throat, tinnitus, trouble swallowing and voice change.    Eyes: Negative for photophobia, pain, discharge, redness, itching and visual disturbance.   Respiratory: Negative for apnea, cough, choking, chest tightness and shortness of breath.    Cardiovascular: Negative for chest pain, palpitations and leg swelling.   Gastrointestinal: Negative for abdominal distention, abdominal pain, constipation, diarrhea, nausea and vomiting.   Endocrine: Negative for cold intolerance, heat intolerance, polydipsia, polyphagia and polyuria.   Genitourinary: Negative for difficulty urinating, dysuria, frequency, hematuria and urgency.   Musculoskeletal: Negative for arthralgias, back pain, gait problem, joint swelling, myalgias, neck pain and neck stiffness.   Skin: Negative for color change, pallor, rash and wound.   Neurological: Negative for dizziness, tremors, weakness, light-headedness, numbness and headaches.   Hematological: Negative for adenopathy. Does not bruise/bleed easily.   Psychiatric/Behavioral: Negative for behavioral problems, confusion and sleep disturbance.        Objective    There were no vitals taken for this visit.  Physical Exam   Constitutional: She is oriented to person, place, and time. She appears well-developed and well-nourished. No distress.   HENT:   Head: Normocephalic and atraumatic.   Right Ear: External ear normal.   Left Ear: External ear normal.   Nose: Nose normal.   Mouth/Throat: Oropharynx is clear and moist.   Eyes: Pupils are equal, round, and reactive to light. Conjunctivae and EOM are normal.   Neck: Normal range of motion.   Pulmonary/Chest: Effort normal. No respiratory distress.   Musculoskeletal: Normal range of motion.   Neurological: She is alert  and oriented to person, place, and time.   Skin: No pallor.   Psychiatric: She has a normal mood and affect. Her behavior is normal. Judgment and thought content normal.       Lab Review  Glucose   Date Value   03/15/2017 169 mg/dL (H)   01/11/2016 181 mg/dl (H)   11/30/2015 127 mg/dl (H)     Sodium (mmol/L)   Date Value   03/15/2017 133 (L)   01/11/2016 136 (L)   11/30/2015 137     Potassium (mmol/L)   Date Value   03/15/2017 4.2   01/11/2016 4.2   11/30/2015 3.8     Chloride (mmol/L)   Date Value   03/15/2017 99   01/11/2016 101   11/30/2015 99     CO2 (mmol/L)   Date Value   03/15/2017 23.0   01/11/2016 23   11/30/2015 25     BUN   Date Value   03/15/2017 10 mg/dL   01/11/2016 14 mg/dl   11/30/2015 12 mg/dl     Creatinine   Date Value   03/15/2017 0.39 mg/dL (L)   01/11/2016 0.4 mg/dl (L)   11/30/2015 0.5 mg/dl     Hemoglobin A1C   Date Value   09/17/2019 8.9   01/11/2016 7.4 %TotHgb (H)     Triglycerides (mg/dl)   Date Value   01/11/2016 121     LDL Cholesterol  (mg/dl)   Date Value   01/11/2016 155 (H)       Assessment/Plan      1. Type 2 diabetes mellitus with hyperglycemia, with long-term current use of insulin (CMS/[facility])    2. Acquired hypothyroidism    .    Medications prescribed:  Outpatient Encounter Medications as of 4/27/2020   Medication Sig Dispense Refill   • amitriptyline (ELAVIL) 50 MG tablet Take 50 mg by mouth Daily.     • atorvastatin (LIPITOR) 40 MG tablet Take 40 mg by mouth Daily.     • Dulaglutide (TRULICITY) 0.75 MG/0.5ML solution pen-injector Inject 0.75 mg under the skin into the appropriate area as directed Every 7 (Seven) Days. 4 pen 11   • Empagliflozin-metFORMIN HCl ER (SYNJARDY XR) 5-1000 MG tablet sustained-release 24 hour Take 2 tablet/day by mouth Daily With Breakfast. 60 tablet 11   • Insulin Glargine (BASAGLAR KWIKPEN) 100 UNIT/ML injection pen 20 units qhs 2 pen 11   • levothyroxine (SYNTHROID, LEVOTHROID) 50 MCG tablet 75 mcg.     • ondansetron ODT (ZOFRAN-ODT) 4 MG  disintegrating tablet Take 1 tablet by mouth Every 8 (Eight) Hours As Needed for Nausea or Vomiting for up to 8 doses. 8 tablet 0   • ONE TOUCH ULTRA TEST test strip      • pioglitazone (ACTOS) 15 MG tablet Take 1 tablet by mouth Daily. 30 tablet 11   • SUMAtriptan (IMITREX) 50 MG tablet Take 50 mg by mouth Daily.     • vitamin E 400 UNIT capsule Take 400 Units by mouth Daily.       No facility-administered encounter medications on file as of 4/27/2020.        Orders placed during this encounter include:  No orders of the defined types were placed in this encounter.    Pt has type 2 diabetes with hyperglycemia        Glycemic Management:              Lab Results   Component Value Date    HGBA1C 8.9 09/17/2019                Toujeo taking 14 units --changed to Basaglar per insurance     basaglar taking 14 units --- increase to 20 units             trulicity 0.75 mg weekly  , consider increasing but nauseous , has migraines that aggravates nausea                  Synjardy makes sick had to stop          Actos 15 mg daily due to fatty liver      Watch fluid accumulation         Jardiance 10 mg once daily         Humalog sliding scale for rescue      Change to 4 units before each meal and then add the scale      Blood sugar              Insulin      200-250                    2 units  251-300                    3 units  301-350                    4 units   Above 351                5 units         Stop glipizide and januiva         Lipid Management               Lab Results   Component Value Date     TRIG 121 01/11/2016                Lab Results   Component Value Date     HDL 38 (L) 01/11/2016                   Lab Results   Component Value Date      (H) 01/11/2016         lipitor 40 mg qhs        Blood Pressure Management:       At goal               Microvascular Complication Monitoring:       Eye Exam Evaluation----over due -- will schedule on her own      -----------     Last Microalbumin-Proteinuria  Assessment           -----------        Neuropathy        Immunizations:             Preventive Care:          Weight Related:         There is no height or weight on file to calculate BMI.             Bone Health              Lab Results   Component Value Date     CALCIUM 8.8 03/15/2017               Thyroid Health              Lab Results   Component Value Date     TSH 5.01 (H) 01/11/2016               On levothyroxine 100                   Other Diabetes Related Aspects         No results found for: [id number]            We spent 25 minutes including reviewing the patients electronic chart, reviewing medications, reviewing labs, active problems    I provided advice regarding diabetes management, dietary changes, adjustments of medication, self titration of insulin, refilled medications, ordering labs, future appointments    Patient was advised to contact the office if there are unanswered questions, trouble with blood glucose management, or any concerns        4. Follow-up: Return in about 4 weeks (around 5/25/2020) for Recheck.

## 2020-05-20 ENCOUNTER — TELEPHONE (OUTPATIENT)
Dept: ENDOCRINOLOGY | Facility: CLINIC | Age: 45
End: 2020-05-20

## 2020-05-28 ENCOUNTER — OFFICE VISIT (OUTPATIENT)
Dept: ENDOCRINOLOGY | Facility: CLINIC | Age: 45
End: 2020-05-28

## 2020-05-28 ENCOUNTER — APPOINTMENT (OUTPATIENT)
Dept: LAB | Facility: HOSPITAL | Age: 45
End: 2020-05-28

## 2020-05-28 VITALS
BODY MASS INDEX: 40.66 KG/M2 | HEIGHT: 66 IN | SYSTOLIC BLOOD PRESSURE: 118 MMHG | WEIGHT: 253 LBS | HEART RATE: 72 BPM | DIASTOLIC BLOOD PRESSURE: 76 MMHG

## 2020-05-28 DIAGNOSIS — E03.8 HYPOTHYROIDISM DUE TO HASHIMOTO'S THYROIDITIS: ICD-10-CM

## 2020-05-28 DIAGNOSIS — E11.59 HYPERTENSION ASSOCIATED WITH DIABETES (HCC): Primary | ICD-10-CM

## 2020-05-28 DIAGNOSIS — Z79.4 TYPE 2 DIABETES MELLITUS WITH HYPERGLYCEMIA, WITH LONG-TERM CURRENT USE OF INSULIN (HCC): ICD-10-CM

## 2020-05-28 DIAGNOSIS — E06.3 HYPOTHYROIDISM DUE TO HASHIMOTO'S THYROIDITIS: ICD-10-CM

## 2020-05-28 DIAGNOSIS — E78.2 MIXED DIABETIC HYPERLIPIDEMIA ASSOCIATED WITH TYPE 2 DIABETES MELLITUS (HCC): ICD-10-CM

## 2020-05-28 DIAGNOSIS — I15.2 HYPERTENSION ASSOCIATED WITH DIABETES (HCC): Primary | ICD-10-CM

## 2020-05-28 DIAGNOSIS — E11.65 TYPE 2 DIABETES MELLITUS WITH HYPERGLYCEMIA, WITH LONG-TERM CURRENT USE OF INSULIN (HCC): ICD-10-CM

## 2020-05-28 DIAGNOSIS — E11.69 MIXED DIABETIC HYPERLIPIDEMIA ASSOCIATED WITH TYPE 2 DIABETES MELLITUS (HCC): ICD-10-CM

## 2020-05-28 LAB
25(OH)D3 SERPL-MCNC: 25.7 NG/ML (ref 30–100)
ALBUMIN SERPL-MCNC: 4.1 G/DL (ref 3.5–5.2)
ALBUMIN UR-MCNC: 9.5 MG/DL
ALBUMIN/GLOB SERPL: 1.2 G/DL
ALP SERPL-CCNC: 112 U/L (ref 39–117)
ALT SERPL W P-5'-P-CCNC: 45 U/L (ref 1–33)
ANION GAP SERPL CALCULATED.3IONS-SCNC: 11.3 MMOL/L (ref 5–15)
AST SERPL-CCNC: 52 U/L (ref 1–32)
BASOPHILS # BLD AUTO: 0.06 10*3/MM3 (ref 0–0.2)
BASOPHILS NFR BLD AUTO: 0.6 % (ref 0–1.5)
BILIRUB SERPL-MCNC: 0.5 MG/DL (ref 0.2–1.2)
BUN BLD-MCNC: 11 MG/DL (ref 6–20)
BUN/CREAT SERPL: 17.7 (ref 7–25)
CALCIUM SPEC-SCNC: 9.5 MG/DL (ref 8.6–10.5)
CHLORIDE SERPL-SCNC: 102 MMOL/L (ref 98–107)
CHOLEST SERPL-MCNC: 195 MG/DL (ref 0–200)
CO2 SERPL-SCNC: 21.7 MMOL/L (ref 22–29)
CREAT BLD-MCNC: 0.62 MG/DL (ref 0.57–1)
CREAT UR-MCNC: 293 MG/DL
CREAT UR-MCNC: 296.8 MG/DL
CREAT UR-MCNC: 296.8 MG/DL
DEPRECATED RDW RBC AUTO: 47.6 FL (ref 37–54)
EOSINOPHIL # BLD AUTO: 0.17 10*3/MM3 (ref 0–0.4)
EOSINOPHIL NFR BLD AUTO: 1.6 % (ref 0.3–6.2)
ERYTHROCYTE [DISTWIDTH] IN BLOOD BY AUTOMATED COUNT: 15.9 % (ref 12.3–15.4)
GFR SERPL CREATININE-BSD FRML MDRD: 106 ML/MIN/1.73
GLOBULIN UR ELPH-MCNC: 3.5 GM/DL
GLUCOSE BLD-MCNC: 243 MG/DL (ref 65–99)
HBA1C MFR BLD: 9.34 % (ref 4.8–5.6)
HCT VFR BLD AUTO: 40.4 % (ref 34–46.6)
HDLC SERPL-MCNC: 33 MG/DL (ref 40–60)
HGB BLD-MCNC: 13.5 G/DL (ref 12–15.9)
IMM GRANULOCYTES # BLD AUTO: 0.04 10*3/MM3 (ref 0–0.05)
IMM GRANULOCYTES NFR BLD AUTO: 0.4 % (ref 0–0.5)
LDLC SERPL CALC-MCNC: 120 MG/DL (ref 0–100)
LDLC/HDLC SERPL: 3.63 {RATIO}
LYMPHOCYTES # BLD AUTO: 2.35 10*3/MM3 (ref 0.7–3.1)
LYMPHOCYTES NFR BLD AUTO: 22 % (ref 19.6–45.3)
MCH RBC QN AUTO: 27.8 PG (ref 26.6–33)
MCHC RBC AUTO-ENTMCNC: 33.4 G/DL (ref 31.5–35.7)
MCV RBC AUTO: 83.3 FL (ref 79–97)
MICROALBUMIN/CREAT UR: 32 MG/G
MONOCYTES # BLD AUTO: 0.62 10*3/MM3 (ref 0.1–0.9)
MONOCYTES NFR BLD AUTO: 5.8 % (ref 5–12)
NEUTROPHILS # BLD AUTO: 7.45 10*3/MM3 (ref 1.7–7)
NEUTROPHILS NFR BLD AUTO: 69.6 % (ref 42.7–76)
NRBC BLD AUTO-RTO: 0 /100 WBC (ref 0–0.2)
PLATELET # BLD AUTO: 308 10*3/MM3 (ref 140–450)
PMV BLD AUTO: 11.5 FL (ref 6–12)
POTASSIUM BLD-SCNC: 4.5 MMOL/L (ref 3.5–5.2)
PROT SERPL-MCNC: 7.6 G/DL (ref 6–8.5)
PROT UR-MCNC: 43 MG/DL
PROT UR-MCNC: 43 MG/DL
PROT/CREAT UR: 144.9 MG/G CREA (ref 0–200)
PROT/CREAT UR: 146.8 MG/G CREA (ref 0–200)
RBC # BLD AUTO: 4.85 10*6/MM3 (ref 3.77–5.28)
SODIUM BLD-SCNC: 135 MMOL/L (ref 136–145)
TRIGL SERPL-MCNC: 211 MG/DL (ref 0–150)
TSH SERPL DL<=0.05 MIU/L-ACNC: 4.9 UIU/ML (ref 0.27–4.2)
VIT B12 BLD-MCNC: 743 PG/ML (ref 211–946)
VLDLC SERPL-MCNC: 42.2 MG/DL (ref 5–40)
WBC NRBC COR # BLD: 10.69 10*3/MM3 (ref 3.4–10.8)

## 2020-05-28 PROCEDURE — 83036 HEMOGLOBIN GLYCOSYLATED A1C: CPT | Performed by: NURSE PRACTITIONER

## 2020-05-28 PROCEDURE — 84156 ASSAY OF PROTEIN URINE: CPT | Performed by: NURSE PRACTITIONER

## 2020-05-28 PROCEDURE — 82043 UR ALBUMIN QUANTITATIVE: CPT | Performed by: NURSE PRACTITIONER

## 2020-05-28 PROCEDURE — 82570 ASSAY OF URINE CREATININE: CPT | Performed by: NURSE PRACTITIONER

## 2020-05-28 PROCEDURE — 82306 VITAMIN D 25 HYDROXY: CPT | Performed by: NURSE PRACTITIONER

## 2020-05-28 PROCEDURE — 99214 OFFICE O/P EST MOD 30 MIN: CPT | Performed by: NURSE PRACTITIONER

## 2020-05-28 PROCEDURE — 80053 COMPREHEN METABOLIC PANEL: CPT | Performed by: NURSE PRACTITIONER

## 2020-05-28 PROCEDURE — 80061 LIPID PANEL: CPT | Performed by: NURSE PRACTITIONER

## 2020-05-28 PROCEDURE — 82607 VITAMIN B-12: CPT | Performed by: NURSE PRACTITIONER

## 2020-05-28 PROCEDURE — 84443 ASSAY THYROID STIM HORMONE: CPT | Performed by: NURSE PRACTITIONER

## 2020-05-28 PROCEDURE — 85025 COMPLETE CBC W/AUTO DIFF WBC: CPT | Performed by: NURSE PRACTITIONER

## 2020-05-28 PROCEDURE — 36415 COLL VENOUS BLD VENIPUNCTURE: CPT | Performed by: NURSE PRACTITIONER

## 2020-05-28 NOTE — PROGRESS NOTES
"  Subjective    Diane Esquivel is a 44 y.o. female. she is here today for follow-up.    History of Present Illness       IN OFFICE VISIT           Referring provider     Primary Care Provider     Mushtaq Mar MD     Reason - diabetes      Duration 10 years     Timing - Diabetes is Constant     Quality - not controlled      Severity -  moderate     Complications - none     Current symptoms/problems  none      Alleviating Factors: Compliance       Side Effects  none     Current diet  in general, a \"healthy\" diet       Current exercise none     Current monitoring regimen: home blood tests - checking 2 x daily                Lab Results   Component Value Date     HGBA1C 8.9 09/17/2019         Home blood sugar records:         This am 256       Running 200s      Hypoglycemia none         The following portions of the patient's history were reviewed and updated as appropriate:   Past Medical History:   Diagnosis Date   • Abnormal liver enzymes    • Acute serous otitis media    • Adult body mass index 30+     obesity   • Dysfunctional uterine bleeding    • Fatty liver    • Hypercholesteremia    • Hypertension associated with diabetes (CMS/HCC) 2/4/2020   • Hypothyroidism due to Hashimoto's thyroiditis 2/4/2020   • Migraine    • Migraine     tension type headache vs migraine      • Mixed diabetic hyperlipidemia associated with type 2 diabetes mellitus (CMS/HCC) 2/4/2020   • Morbid (severe) obesity due to excess calories (CMS/[facility])     BMI 41   • Nausea, vomiting and diarrhea    • Non-alcoholic fatty liver disease    • Type 2 diabetes mellitus (CMS/[facility])     A1C 7.4   • Type 2 diabetes mellitus with hyperglycemia, with long-term current use of insulin (CMS/[facility]) 2/4/2020     Past Surgical History:   Procedure Laterality Date   • CHOLECYSTECTOMY     • INJECTION OF MEDICATION  02/18/2014    Phenergan (2)      • INJECTION OF MEDICATION  02/24/2014    Toradol (2)    • INJECTION OF MEDICATION  03/09/2014    Zofran (2)    "     History reviewed. No pertinent family history.  OB History    None       Current Outpatient Medications   Medication Sig Dispense Refill   • amitriptyline (ELAVIL) 50 MG tablet Take 50 mg by mouth Daily.     • atorvastatin (LIPITOR) 40 MG tablet Take 40 mg by mouth Daily.     • Dulaglutide (TRULICITY) 0.75 MG/0.5ML solution pen-injector Inject 0.75 mg under the skin into the appropriate area as directed Every 7 (Seven) Days. 4 pen 11   • Empagliflozin-metFORMIN HCl ER (SYNJARDY XR) 5-1000 MG tablet sustained-release 24 hour Take 2 tablet/day by mouth Daily With Breakfast. 60 tablet 11   • Insulin Glargine (BASAGLAR KWIKPEN) 100 UNIT/ML injection pen 20 units qhs 2 pen 11   • Insulin Pen Needle (Advocate Insulin Pen Needles) 31G X 8 MM misc Inject 4 times daily 120 each 11   • levothyroxine (SYNTHROID, LEVOTHROID) 50 MCG tablet 75 mcg.     • ondansetron ODT (ZOFRAN-ODT) 4 MG disintegrating tablet Take 1 tablet by mouth Every 8 (Eight) Hours As Needed for Nausea or Vomiting for up to 8 doses. 8 tablet 0   • ONE TOUCH ULTRA TEST test strip      • pioglitazone (ACTOS) 15 MG tablet Take 1 tablet by mouth Daily. 30 tablet 11   • SITagliptin (Januvia) 100 MG tablet 100 mg by mouth  daily before breakfast 30 tablet 11   • SUMAtriptan (IMITREX) 50 MG tablet Take 50 mg by mouth Daily.     • vitamin E 400 UNIT capsule Take 400 Units by mouth Daily.       No current facility-administered medications for this visit.      Allergies   Allergen Reactions   • Caffeine      Social History     Socioeconomic History   • Marital status:      Spouse name: Not on file   • Number of children: Not on file   • Years of education: Not on file   • Highest education level: Not on file   Tobacco Use   • Smoking status: Never Smoker   • Smokeless tobacco: Never Used   Substance and Sexual Activity   • Alcohol use: No   • Drug use: No   • Sexual activity: Defer       Review of Systems  Review of Systems   Constitutional: Negative for  "activity change, appetite change, diaphoresis and fatigue.   HENT: Negative for facial swelling, sneezing, sore throat, tinnitus, trouble swallowing and voice change.    Eyes: Negative for photophobia, pain, discharge, redness, itching and visual disturbance.   Respiratory: Negative for apnea, cough, choking, chest tightness and shortness of breath.    Cardiovascular: Negative for chest pain, palpitations and leg swelling.   Gastrointestinal: Negative for abdominal distention, abdominal pain, constipation, diarrhea, nausea and vomiting.   Endocrine: Negative for cold intolerance, heat intolerance, polydipsia, polyphagia and polyuria.   Genitourinary: Negative for difficulty urinating, dysuria, frequency, hematuria and urgency.   Musculoskeletal: Negative for arthralgias, back pain, gait problem, joint swelling, myalgias, neck pain and neck stiffness.   Skin: Negative for color change, pallor, rash and wound.   Neurological: Negative for dizziness, tremors, weakness, light-headedness, numbness and headaches.   Hematological: Negative for adenopathy. Does not bruise/bleed easily.   Psychiatric/Behavioral: Negative for behavioral problems, confusion and sleep disturbance.        Objective    /76   Pulse 72   Ht 167.6 cm (66\")   Wt 115 kg (253 lb)   BMI 40.84 kg/m²   Physical Exam   Constitutional: She is oriented to person, place, and time. She appears well-developed and well-nourished. No distress.   HENT:   Head: Normocephalic and atraumatic.   Right Ear: External ear normal.   Left Ear: External ear normal.   Nose: Nose normal.   Eyes: Pupils are equal, round, and reactive to light. Conjunctivae and EOM are normal.   Neck: Normal range of motion. Neck supple. No tracheal deviation present. No thyromegaly present.   Cardiovascular: Normal rate, regular rhythm and normal heart sounds.   No murmur heard.  Pulmonary/Chest: Effort normal and breath sounds normal. No respiratory distress. She has no wheezes. "   Abdominal: Soft. Bowel sounds are normal. There is no tenderness. There is no rebound and no guarding.   Musculoskeletal: Normal range of motion. She exhibits no edema, tenderness or deformity.   Neurological: She is alert and oriented to person, place, and time. No cranial nerve deficit.   Skin: Skin is warm and dry. No rash noted.   Psychiatric: She has a normal mood and affect. Her behavior is normal. Judgment and thought content normal.       Lab Review  Glucose   Date Value   03/15/2017 169 mg/dL (H)   01/11/2016 181 mg/dl (H)   11/30/2015 127 mg/dl (H)     Sodium (mmol/L)   Date Value   03/15/2017 133 (L)   01/11/2016 136 (L)   11/30/2015 137     Potassium (mmol/L)   Date Value   03/15/2017 4.2   01/11/2016 4.2   11/30/2015 3.8     Chloride (mmol/L)   Date Value   03/15/2017 99   01/11/2016 101   11/30/2015 99     CO2 (mmol/L)   Date Value   03/15/2017 23.0   01/11/2016 23   11/30/2015 25     BUN   Date Value   03/15/2017 10 mg/dL   01/11/2016 14 mg/dl   11/30/2015 12 mg/dl     Creatinine   Date Value   03/15/2017 0.39 mg/dL (L)   01/11/2016 0.4 mg/dl (L)   11/30/2015 0.5 mg/dl     Hemoglobin A1C   Date Value   09/17/2019 8.9   01/11/2016 7.4 %TotHgb (H)     Triglycerides (mg/dl)   Date Value   01/11/2016 121     LDL Cholesterol  (mg/dl)   Date Value   01/11/2016 155 (H)       Assessment/Plan      1. Hypertension associated with diabetes (CMS/[facility])    2. Mixed diabetic hyperlipidemia associated with type 2 diabetes mellitus (CMS/[facility])    3. Type 2 diabetes mellitus with hyperglycemia, with long-term current use of insulin (CMS/[facility])    4. Hypothyroidism due to Hashimoto's thyroiditis    .    Medications prescribed:  Outpatient Encounter Medications as of 5/28/2020   Medication Sig Dispense Refill   • amitriptyline (ELAVIL) 50 MG tablet Take 50 mg by mouth Daily.     • atorvastatin (LIPITOR) 40 MG tablet Take 40 mg by mouth Daily.     • Dulaglutide (TRULICITY) 0.75 MG/0.5ML solution pen-injector Inject 0.75 mg  under the skin into the appropriate area as directed Every 7 (Seven) Days. 4 pen 11   • Empagliflozin-metFORMIN HCl ER (SYNJARDY XR) 5-1000 MG tablet sustained-release 24 hour Take 2 tablet/day by mouth Daily With Breakfast. 60 tablet 11   • Insulin Glargine (BASAGLAR KWIKPEN) 100 UNIT/ML injection pen 20 units qhs 2 pen 11   • Insulin Pen Needle (Advocate Insulin Pen Needles) 31G X 8 MM misc Inject 4 times daily 120 each 11   • levothyroxine (SYNTHROID, LEVOTHROID) 50 MCG tablet 75 mcg.     • ondansetron ODT (ZOFRAN-ODT) 4 MG disintegrating tablet Take 1 tablet by mouth Every 8 (Eight) Hours As Needed for Nausea or Vomiting for up to 8 doses. 8 tablet 0   • ONE TOUCH ULTRA TEST test strip      • pioglitazone (ACTOS) 15 MG tablet Take 1 tablet by mouth Daily. 30 tablet 11   • SITagliptin (Januvia) 100 MG tablet 100 mg by mouth  daily before breakfast 30 tablet 11   • SUMAtriptan (IMITREX) 50 MG tablet Take 50 mg by mouth Daily.     • vitamin E 400 UNIT capsule Take 400 Units by mouth Daily.       No facility-administered encounter medications on file as of 5/28/2020.        Orders placed during this encounter include:  Orders Placed This Encounter   Procedures   • Comprehensive Metabolic Panel   • Hemoglobin A1c   • TSH   • Vitamin B12   • Vitamin D 25 Hydroxy   • Protein / Creatinine Ratio, Urine - Urine, Clean Catch   • Microalbumin / Creatinine Urine Ratio - Urine, Clean Catch   • CBC & Differential     Order Specific Question:   Manual Differential     Answer:   No     Pt has type 2 diabetes with hyperglycemia        Glycemic Management:                     Lab Results   Component Value Date     HGBA1C 8.9 09/17/2019                     basaglar taking 20 units  ---increase to 24 units             trulicity 0.75 mg weekly  , consider increasing but nauseous , has migraines that aggravates nausea----stop due to nausea    Restart Januvia 100 mg one daily                   Synjardy makes sick had to stop           Actos 15 mg daily due to fatty liver      Watch fluid accumulation         Jardiance 10 mg once daily         Humalog sliding scale for rescue         4 units before each meal ---increase to 6 units      Blood sugar              Insulin      200-250                    2 units  251-300                    3 units  301-350                    4 units   Above 351                5 units         Stop glipizide        Lipid Management               Lab Results   Component Value Date     TRIG 121 01/11/2016                Lab Results   Component Value Date     HDL 38 (L) 01/11/2016                   Lab Results   Component Value Date      (H) 01/11/2016         lipitor 40 mg qhs        Blood Pressure Management:       At goal               Microvascular Complication Monitoring:       Eye Exam Evaluation----over due -- will schedule on her own      -----------     Last Microalbumin-Proteinuria Assessment           -----------        Neuropathy        Immunizations:             Preventive Care:          Weight Related:      Patient's Body mass index is 40.84 kg/m². BMI is above normal parameters. Recommendations include: nutrition counseling.                Bone Health                  Lab Results   Component Value Date     CALCIUM 8.8 03/15/2017               Thyroid Health                  Lab Results   Component Value Date     TSH 5.01 (H) 01/11/2016               On levothyroxine 100                     Other Diabetes Related Aspects         No results found for: [id number]                 4. Follow-up: No follow-ups on file.

## 2020-06-22 ENCOUNTER — TELEPHONE (OUTPATIENT)
Dept: ENDOCRINOLOGY | Facility: CLINIC | Age: 45
End: 2020-06-22

## 2020-06-25 ENCOUNTER — TELEPHONE (OUTPATIENT)
Dept: ENDOCRINOLOGY | Facility: CLINIC | Age: 45
End: 2020-06-25

## 2020-06-25 NOTE — TELEPHONE ENCOUNTER
Pt aware. A1C 9.3 - insulin was increased at last appt. Keep taking Vitamin d. Thyroid - keep same dose. Take one daily except on Sunday take 1.5 tablets.

## 2020-07-28 ENCOUNTER — OFFICE VISIT (OUTPATIENT)
Dept: ENDOCRINOLOGY | Facility: CLINIC | Age: 45
End: 2020-07-28

## 2020-07-28 VITALS
SYSTOLIC BLOOD PRESSURE: 136 MMHG | HEART RATE: 87 BPM | DIASTOLIC BLOOD PRESSURE: 88 MMHG | BODY MASS INDEX: 41.38 KG/M2 | HEIGHT: 66 IN | WEIGHT: 257.5 LBS | OXYGEN SATURATION: 98 %

## 2020-07-28 DIAGNOSIS — Z79.4 TYPE 2 DIABETES MELLITUS WITH HYPERGLYCEMIA, WITH LONG-TERM CURRENT USE OF INSULIN (HCC): Primary | ICD-10-CM

## 2020-07-28 DIAGNOSIS — E11.69 MIXED DIABETIC HYPERLIPIDEMIA ASSOCIATED WITH TYPE 2 DIABETES MELLITUS (HCC): ICD-10-CM

## 2020-07-28 DIAGNOSIS — E55.9 VITAMIN D DEFICIENCY: ICD-10-CM

## 2020-07-28 DIAGNOSIS — E11.59 HYPERTENSION ASSOCIATED WITH DIABETES (HCC): ICD-10-CM

## 2020-07-28 DIAGNOSIS — E78.2 MIXED DIABETIC HYPERLIPIDEMIA ASSOCIATED WITH TYPE 2 DIABETES MELLITUS (HCC): ICD-10-CM

## 2020-07-28 DIAGNOSIS — E11.65 TYPE 2 DIABETES MELLITUS WITH HYPERGLYCEMIA, WITH LONG-TERM CURRENT USE OF INSULIN (HCC): Primary | ICD-10-CM

## 2020-07-28 DIAGNOSIS — I15.2 HYPERTENSION ASSOCIATED WITH DIABETES (HCC): ICD-10-CM

## 2020-07-28 PROCEDURE — 99214 OFFICE O/P EST MOD 30 MIN: CPT | Performed by: NURSE PRACTITIONER

## 2020-07-28 RX ORDER — INSULIN LISPRO 100 [IU]/ML
INJECTION, SOLUTION INTRAVENOUS; SUBCUTANEOUS
Qty: 4 PEN | Refills: 11 | Status: SHIPPED | OUTPATIENT
Start: 2020-07-28 | End: 2021-08-09 | Stop reason: SDUPTHER

## 2020-07-28 RX ORDER — INSULIN GLARGINE 100 [IU]/ML
INJECTION, SOLUTION SUBCUTANEOUS
Qty: 3 PEN | Refills: 11 | Status: SHIPPED | OUTPATIENT
Start: 2020-07-28 | End: 2021-03-04 | Stop reason: SDUPTHER

## 2020-10-28 ENCOUNTER — LAB (OUTPATIENT)
Dept: LAB | Facility: HOSPITAL | Age: 45
End: 2020-10-28

## 2020-10-28 ENCOUNTER — OFFICE VISIT (OUTPATIENT)
Dept: ENDOCRINOLOGY | Facility: CLINIC | Age: 45
End: 2020-10-28

## 2020-10-28 VITALS
OXYGEN SATURATION: 100 % | WEIGHT: 248.3 LBS | HEIGHT: 66 IN | BODY MASS INDEX: 39.91 KG/M2 | DIASTOLIC BLOOD PRESSURE: 76 MMHG | HEART RATE: 75 BPM | SYSTOLIC BLOOD PRESSURE: 124 MMHG

## 2020-10-28 DIAGNOSIS — Z79.4 TYPE 2 DIABETES MELLITUS WITH HYPERGLYCEMIA, WITH LONG-TERM CURRENT USE OF INSULIN (HCC): ICD-10-CM

## 2020-10-28 DIAGNOSIS — I15.2 HYPERTENSION ASSOCIATED WITH DIABETES (HCC): Primary | ICD-10-CM

## 2020-10-28 DIAGNOSIS — E11.65 TYPE 2 DIABETES MELLITUS WITH HYPERGLYCEMIA, WITH LONG-TERM CURRENT USE OF INSULIN (HCC): ICD-10-CM

## 2020-10-28 DIAGNOSIS — E11.59 HYPERTENSION ASSOCIATED WITH DIABETES (HCC): Primary | ICD-10-CM

## 2020-10-28 DIAGNOSIS — E03.8 HYPOTHYROIDISM DUE TO HASHIMOTO'S THYROIDITIS: ICD-10-CM

## 2020-10-28 DIAGNOSIS — E11.69 MIXED DIABETIC HYPERLIPIDEMIA ASSOCIATED WITH TYPE 2 DIABETES MELLITUS (HCC): ICD-10-CM

## 2020-10-28 DIAGNOSIS — E06.3 HYPOTHYROIDISM DUE TO HASHIMOTO'S THYROIDITIS: ICD-10-CM

## 2020-10-28 DIAGNOSIS — E78.2 MIXED DIABETIC HYPERLIPIDEMIA ASSOCIATED WITH TYPE 2 DIABETES MELLITUS (HCC): ICD-10-CM

## 2020-10-28 LAB
25(OH)D3 SERPL-MCNC: 32.2 NG/ML (ref 30–100)
ALBUMIN SERPL-MCNC: 4 G/DL (ref 3.5–5.2)
ALBUMIN UR-MCNC: 5.8 MG/DL
ALBUMIN/GLOB SERPL: 1.2 G/DL
ALP SERPL-CCNC: 113 U/L (ref 39–117)
ALT SERPL W P-5'-P-CCNC: 34 U/L (ref 1–33)
ANION GAP SERPL CALCULATED.3IONS-SCNC: 11.4 MMOL/L (ref 5–15)
AST SERPL-CCNC: 32 U/L (ref 1–32)
BASOPHILS # BLD AUTO: 0.06 10*3/MM3 (ref 0–0.2)
BASOPHILS NFR BLD AUTO: 0.7 % (ref 0–1.5)
BILIRUB SERPL-MCNC: 0.3 MG/DL (ref 0–1.2)
BUN SERPL-MCNC: 7 MG/DL (ref 6–20)
BUN/CREAT SERPL: 17.9 (ref 7–25)
CALCIUM SPEC-SCNC: 9.3 MG/DL (ref 8.6–10.5)
CHLORIDE SERPL-SCNC: 103 MMOL/L (ref 98–107)
CHOLEST SERPL-MCNC: 199 MG/DL (ref 0–200)
CO2 SERPL-SCNC: 22.6 MMOL/L (ref 22–29)
CREAT SERPL-MCNC: 0.39 MG/DL (ref 0.57–1)
CREAT UR-MCNC: 174.2 MG/DL
CREAT UR-MCNC: 174.2 MG/DL
DEPRECATED RDW RBC AUTO: 44.8 FL (ref 37–54)
EOSINOPHIL # BLD AUTO: 0.18 10*3/MM3 (ref 0–0.4)
EOSINOPHIL NFR BLD AUTO: 2.2 % (ref 0.3–6.2)
ERYTHROCYTE [DISTWIDTH] IN BLOOD BY AUTOMATED COUNT: 14.5 % (ref 12.3–15.4)
GFR SERPL CREATININE-BSD FRML MDRD: >150 ML/MIN/1.73
GLOBULIN UR ELPH-MCNC: 3.4 GM/DL
GLUCOSE SERPL-MCNC: 227 MG/DL (ref 65–99)
HBA1C MFR BLD: 10.1 % (ref 4.8–5.6)
HCT VFR BLD AUTO: 38.8 % (ref 34–46.6)
HDLC SERPL-MCNC: 35 MG/DL (ref 40–60)
HGB BLD-MCNC: 13 G/DL (ref 12–15.9)
IMM GRANULOCYTES # BLD AUTO: 0.08 10*3/MM3 (ref 0–0.05)
IMM GRANULOCYTES NFR BLD AUTO: 1 % (ref 0–0.5)
LDLC SERPL CALC-MCNC: 135 MG/DL (ref 0–100)
LDLC/HDLC SERPL: 3.76 {RATIO}
LYMPHOCYTES # BLD AUTO: 2.9 10*3/MM3 (ref 0.7–3.1)
LYMPHOCYTES NFR BLD AUTO: 35.2 % (ref 19.6–45.3)
MCH RBC QN AUTO: 28.6 PG (ref 26.6–33)
MCHC RBC AUTO-ENTMCNC: 33.5 G/DL (ref 31.5–35.7)
MCV RBC AUTO: 85.3 FL (ref 79–97)
MICROALBUMIN/CREAT UR: 33.3 MG/G
MONOCYTES # BLD AUTO: 0.48 10*3/MM3 (ref 0.1–0.9)
MONOCYTES NFR BLD AUTO: 5.8 % (ref 5–12)
NEUTROPHILS NFR BLD AUTO: 4.53 10*3/MM3 (ref 1.7–7)
NEUTROPHILS NFR BLD AUTO: 55.1 % (ref 42.7–76)
NRBC BLD AUTO-RTO: 0 /100 WBC (ref 0–0.2)
PLATELET # BLD AUTO: 290 10*3/MM3 (ref 140–450)
PMV BLD AUTO: 11.7 FL (ref 6–12)
POTASSIUM SERPL-SCNC: 4 MMOL/L (ref 3.5–5.2)
PROT SERPL-MCNC: 7.4 G/DL (ref 6–8.5)
PROT UR-MCNC: 27.3 MG/DL
PROT/CREAT UR: 156.7 MG/G CREA (ref 0–200)
RBC # BLD AUTO: 4.55 10*6/MM3 (ref 3.77–5.28)
SODIUM SERPL-SCNC: 137 MMOL/L (ref 136–145)
TRIGL SERPL-MCNC: 162 MG/DL (ref 0–150)
TSH SERPL DL<=0.05 MIU/L-ACNC: 7.54 UIU/ML (ref 0.27–4.2)
VIT B12 BLD-MCNC: 813 PG/ML (ref 211–946)
VLDLC SERPL-MCNC: 29 MG/DL (ref 5–40)
WBC # BLD AUTO: 8.23 10*3/MM3 (ref 3.4–10.8)

## 2020-10-28 PROCEDURE — 80053 COMPREHEN METABOLIC PANEL: CPT | Performed by: NURSE PRACTITIONER

## 2020-10-28 PROCEDURE — 84443 ASSAY THYROID STIM HORMONE: CPT | Performed by: NURSE PRACTITIONER

## 2020-10-28 PROCEDURE — 82607 VITAMIN B-12: CPT | Performed by: NURSE PRACTITIONER

## 2020-10-28 PROCEDURE — 82306 VITAMIN D 25 HYDROXY: CPT | Performed by: NURSE PRACTITIONER

## 2020-10-28 PROCEDURE — 36415 COLL VENOUS BLD VENIPUNCTURE: CPT | Performed by: NURSE PRACTITIONER

## 2020-10-28 PROCEDURE — 80061 LIPID PANEL: CPT | Performed by: NURSE PRACTITIONER

## 2020-10-28 PROCEDURE — 82043 UR ALBUMIN QUANTITATIVE: CPT | Performed by: NURSE PRACTITIONER

## 2020-10-28 PROCEDURE — 83036 HEMOGLOBIN GLYCOSYLATED A1C: CPT | Performed by: NURSE PRACTITIONER

## 2020-10-28 PROCEDURE — 99214 OFFICE O/P EST MOD 30 MIN: CPT | Performed by: NURSE PRACTITIONER

## 2020-10-28 PROCEDURE — 85025 COMPLETE CBC W/AUTO DIFF WBC: CPT | Performed by: NURSE PRACTITIONER

## 2020-10-28 PROCEDURE — 82570 ASSAY OF URINE CREATININE: CPT | Performed by: NURSE PRACTITIONER

## 2020-10-28 PROCEDURE — 84156 ASSAY OF PROTEIN URINE: CPT | Performed by: NURSE PRACTITIONER

## 2020-10-28 RX ORDER — LEVOTHYROXINE SODIUM 88 UG/1
88 TABLET ORAL DAILY
Qty: 30 TABLET | Refills: 11 | Status: SHIPPED | OUTPATIENT
Start: 2020-10-28 | End: 2022-02-14 | Stop reason: SDUPTHER

## 2020-10-28 NOTE — PROGRESS NOTES
"  Subjective    Diane Esquivel is a 45 y.o. female. she is here today for follow-up.    History of Present Illness      IN OFFICE VISIT    Referring provider     Primary Care Provider     Lisa Veras MD       Reason - diabetes mellitus type 2      Duration 10 years     Timing - constant      Quality - states improved      Severity -  moderate     Complications - hyperglycemia in the am      Current symptoms/problems  hyperglycemia in the am      Alleviating Factors: Compliance       Side Effects  could not tolerate GLP-1      Current diet  in general, a \"healthy\" diet       Current exercise none     Current monitoring regimen: home blood tests - checking 2 x daily         Lab Results   Component Value Date    HGBA1C 10.10 (H) 10/28/2020             Home blood sugar records:      Am 150 up to 180    At goal during day         Hypoglycemia denies any lows     History reviewed      The following portions of the patient's history were reviewed and updated as appropriate:   Past Medical History:   Diagnosis Date   • Abnormal liver enzymes    • Acute serous otitis media    • Adult body mass index 30+     obesity   • Dysfunctional uterine bleeding    • Fatty liver    • Hypercholesteremia    • Hypertension associated with diabetes (CMS/[facility]) 2/4/2020   • Hypothyroidism due to Hashimoto's thyroiditis 2/4/2020   • Migraine    • Migraine     tension type headache vs migraine      • Mixed diabetic hyperlipidemia associated with type 2 diabetes mellitus (CMS/HCC) 2/4/2020   • Morbid (severe) obesity due to excess calories (CMS/[facility])     BMI 41   • Nausea, vomiting and diarrhea    • Non-alcoholic fatty liver disease    • Type 2 diabetes mellitus (CMS/[facility])     A1C 7.4   • Type 2 diabetes mellitus with hyperglycemia, with long-term current use of insulin (CMS/[facility]) 2/4/2020     Past Surgical History:   Procedure Laterality Date   • CHOLECYSTECTOMY     • INJECTION OF MEDICATION  02/18/2014    Phenergan (2)      • INJECTION OF " MEDICATION  02/24/2014    Toradol (2)    • INJECTION OF MEDICATION  03/09/2014    Zofran (2)        Family History   Family history unknown: Yes     OB History    No obstetric history on file.       Current Outpatient Medications   Medication Sig Dispense Refill   • amitriptyline (ELAVIL) 50 MG tablet Take 50 mg by mouth Daily.     • atorvastatin (LIPITOR) 40 MG tablet Take 40 mg by mouth Daily.     • Insulin Glargine (BASAGLAR KWIKPEN) 100 UNIT/ML injection pen 30  units qhs 3 pen 11   • Insulin Lispro, 1 Unit Dial, (HumaLOG KwikPen) 100 UNIT/ML solution pen-injector 6 up to 15 units with each meal TID 4 pen 11   • Insulin Pen Needle (Advocate Insulin Pen Needles) 31G X 8 MM misc Inject 4 times daily 120 each 11   • levothyroxine (SYNTHROID, LEVOTHROID) 50 MCG tablet Take 75 mcg by mouth Daily. Takes 1.5 tablets to = 75mcg     • ondansetron ODT (ZOFRAN-ODT) 4 MG disintegrating tablet Take 1 tablet by mouth Every 8 (Eight) Hours As Needed for Nausea or Vomiting for up to 8 doses. 8 tablet 0   • ONE TOUCH ULTRA TEST test strip      • pioglitazone (ACTOS) 15 MG tablet Take 1 tablet by mouth Daily. 30 tablet 11   • SITagliptin (Januvia) 100 MG tablet 100 mg by mouth  daily before breakfast 30 tablet 11   • SUMAtriptan (IMITREX) 50 MG tablet Take 50 mg by mouth Daily.     • vitamin E 400 UNIT capsule Take 400 Units by mouth Daily.       No current facility-administered medications for this visit.      Allergies   Allergen Reactions   • Caffeine      Social History     Socioeconomic History   • Marital status:      Spouse name: Not on file   • Number of children: Not on file   • Years of education: Not on file   • Highest education level: Not on file   Tobacco Use   • Smoking status: Never Smoker   • Smokeless tobacco: Never Used   Substance and Sexual Activity   • Alcohol use: No   • Drug use: No   • Sexual activity: Defer       Review of Systems  Review of Systems   Constitutional: Negative for activity change,  "appetite change, diaphoresis and fatigue.   HENT: Negative for facial swelling, sneezing, sore throat, tinnitus, trouble swallowing and voice change.    Eyes: Negative for photophobia, pain, discharge, redness, itching and visual disturbance.   Respiratory: Negative for apnea, cough, choking, chest tightness and shortness of breath.    Cardiovascular: Negative for chest pain, palpitations and leg swelling.   Gastrointestinal: Negative for abdominal distention, abdominal pain, constipation, diarrhea, nausea and vomiting.   Endocrine: Negative for cold intolerance, heat intolerance, polydipsia, polyphagia and polyuria.   Genitourinary: Negative for difficulty urinating, dysuria, frequency, hematuria and urgency.   Musculoskeletal: Negative for arthralgias, back pain, gait problem, joint swelling, myalgias, neck pain and neck stiffness.   Skin: Negative for color change, pallor, rash and wound.   Neurological: Negative for dizziness, tremors, weakness, light-headedness, numbness and headaches.   Hematological: Negative for adenopathy. Does not bruise/bleed easily.   Psychiatric/Behavioral: Negative for behavioral problems, confusion and sleep disturbance.        Objective    /76   Pulse 75   Ht 167.6 cm (66\")   Wt 113 kg (248 lb 4.8 oz)   SpO2 100%   BMI 40.08 kg/m²   Physical Exam  Constitutional:       General: She is not in acute distress.     Appearance: She is well-developed.   HENT:      Head: Normocephalic and atraumatic.      Right Ear: External ear normal.      Left Ear: External ear normal.      Nose: Nose normal.   Eyes:      Conjunctiva/sclera: Conjunctivae normal.      Pupils: Pupils are equal, round, and reactive to light.   Neck:      Musculoskeletal: Normal range of motion and neck supple.      Thyroid: No thyromegaly.      Trachea: No tracheal deviation.   Cardiovascular:      Rate and Rhythm: Normal rate and regular rhythm.      Heart sounds: Normal heart sounds. No murmur.   Pulmonary:     "  Effort: Pulmonary effort is normal. No respiratory distress.      Breath sounds: Normal breath sounds. No wheezing.   Abdominal:      General: Bowel sounds are normal.      Palpations: Abdomen is soft.      Tenderness: There is no abdominal tenderness. There is no guarding or rebound.   Musculoskeletal: Normal range of motion.         General: No tenderness or deformity.   Skin:     General: Skin is warm and dry.      Findings: No rash.   Neurological:      Mental Status: She is alert and oriented to person, place, and time.      Cranial Nerves: No cranial nerve deficit.   Psychiatric:         Behavior: Behavior normal.         Thought Content: Thought content normal.         Judgment: Judgment normal.         Lab Review  Glucose   Date Value   05/28/2020 243 mg/dL (H)   03/15/2017 169 mg/dL (H)   01/11/2016 181 mg/dl (H)   11/30/2015 127 mg/dl (H)     Sodium (mmol/L)   Date Value   05/28/2020 135 (L)   03/15/2017 133 (L)   01/11/2016 136 (L)   11/30/2015 137     Potassium (mmol/L)   Date Value   05/28/2020 4.5   03/15/2017 4.2   01/11/2016 4.2   11/30/2015 3.8     Chloride (mmol/L)   Date Value   05/28/2020 102   03/15/2017 99   01/11/2016 101   11/30/2015 99     CO2 (mmol/L)   Date Value   05/28/2020 21.7 (L)   03/15/2017 23.0   01/11/2016 23   11/30/2015 25     BUN   Date Value   05/28/2020 11 mg/dL   03/15/2017 10 mg/dL   01/11/2016 14 mg/dl   11/30/2015 12 mg/dl     Creatinine   Date Value   05/28/2020 0.62 mg/dL   03/15/2017 0.39 mg/dL (L)   01/11/2016 0.4 mg/dl (L)   11/30/2015 0.5 mg/dl     Hemoglobin A1C   Date Value   10/28/2020 10.10 % (H)   05/28/2020 9.34 % (H)   09/17/2019 8.9   01/11/2016 7.4 %TotHgb (H)     Triglycerides   Date Value   05/28/2020 211 mg/dL (H)   01/11/2016 121 mg/dl     LDL Cholesterol    Date Value   05/28/2020 120 mg/dL (H)   01/11/2016 155 mg/dl (H)       Assessment/Plan      1. Hypertension associated with diabetes (CMS/[facility])    2. Mixed diabetic hyperlipidemia associated with  type 2 diabetes mellitus (CMS/[facility])    3. Type 2 diabetes mellitus with hyperglycemia, with long-term current use of insulin (CMS/[facility])    4. Hypothyroidism due to Hashimoto's thyroiditis    .    Medications prescribed:  Outpatient Encounter Medications as of 10/28/2020   Medication Sig Dispense Refill   • amitriptyline (ELAVIL) 50 MG tablet Take 50 mg by mouth Daily.     • atorvastatin (LIPITOR) 40 MG tablet Take 40 mg by mouth Daily.     • Insulin Glargine (BASAGLAR KWIKPEN) 100 UNIT/ML injection pen 30  units qhs 3 pen 11   • Insulin Lispro, 1 Unit Dial, (HumaLOG KwikPen) 100 UNIT/ML solution pen-injector 6 up to 15 units with each meal TID 4 pen 11   • Insulin Pen Needle (Advocate Insulin Pen Needles) 31G X 8 MM misc Inject 4 times daily 120 each 11   • levothyroxine (SYNTHROID, LEVOTHROID) 50 MCG tablet Take 75 mcg by mouth Daily. Takes 1.5 tablets to = 75mcg     • ondansetron ODT (ZOFRAN-ODT) 4 MG disintegrating tablet Take 1 tablet by mouth Every 8 (Eight) Hours As Needed for Nausea or Vomiting for up to 8 doses. 8 tablet 0   • ONE TOUCH ULTRA TEST test strip      • pioglitazone (ACTOS) 15 MG tablet Take 1 tablet by mouth Daily. 30 tablet 11   • SITagliptin (Januvia) 100 MG tablet 100 mg by mouth  daily before breakfast 30 tablet 11   • SUMAtriptan (IMITREX) 50 MG tablet Take 50 mg by mouth Daily.     • vitamin E 400 UNIT capsule Take 400 Units by mouth Daily.       No facility-administered encounter medications on file as of 10/28/2020.        Orders placed during this encounter include:  Orders Placed This Encounter   Procedures   • Comprehensive Metabolic Panel   • Hemoglobin A1c   • Lipid Panel   • Microalbumin / Creatinine Urine Ratio - Urine, Clean Catch   • Protein / Creatinine Ratio, Urine - Urine, Clean Catch   • TSH   • Vitamin B12   • Vitamin D 25 Hydroxy   • CBC Auto Differential   • CBC & Differential     Order Specific Question:   Manual Differential     Answer:   No             Glycemic  Management:       diabetes mellitus type 2 with hyperglycemia      Lab Results   Component Value Date    HGBA1C 10.10 (H) 10/28/2020                basaglar taking   28 units ----increase to 32 units             trulicity 0.75 mg weekly  --stop due to nausea      Januvia 100 mg one daily --keep                   Synjardy makes sick had to stop          Actos 15 mg daily due to fatty liver      Watch fluid accumulation         Jardiance 10 mg once daily         Humalog before each meal          6 units before each meal --keep      Blood sugar              Insulin      200-250                    2 units  251-300                    3 units  301-350                    4 units   Above 351                5 units         Stop glipizide        Lipid Management              Total Cholesterol   Date Value Ref Range Status   05/28/2020 195 0 - 200 mg/dL Final            Triglycerides   Date Value Ref Range Status   05/28/2020 211 (H) 0 - 150 mg/dL Final            HDL Cholesterol   Date Value Ref Range Status   05/28/2020 33 (L) 40 - 60 mg/dL Final            LDL Cholesterol    Date Value Ref Range Status   05/28/2020 120 (H) 0 - 100 mg/dL Final            lipitor 40 mg qhs    No changes         Blood Pressure Management:       At goal       not on ACEi            Microvascular Complication Monitoring:       Eye Exam Evaluation--  will schedule on her own      -----------     Last Microalbumin-Proteinuria Assessment                 Lab Results   Component Value Date     MICROALBUR 9.5 05/28/2020            Neuropathy    Taking elavil 50 mg                  Preventive Care:     Nonsmoker          Weight Related:      Patient's Body mass index is 40.08 kg/m². BMI is above normal parameters. Recommendations include: nutrition counseling.        decrease daily caloric intake by 500 calories per day            Bone Health           Component      Latest Ref Rng & Units 5/28/2020   25 Hydroxy, Vitamin D      30.0 - 100.0 ng/ml 25.7  (L)          taking vitamin d supplement -- keep     Reassess today      Thyroid Health   hypothyroidism      On levothyroxine 50 mcg take 1 1/2 daily            Lab Results   Component Value Date     TSH 4.900 (H) 05/28/2020                    Other Diabetes Related Aspects               Lab Results   Component Value Date     [id number] 05/28/2020               Scheduled for sleep study next month      Labs today and I will with results     4. Follow-up: Return in about 3 months (around 1/28/2021) for Recheck.

## 2020-10-29 ENCOUNTER — TELEPHONE (OUTPATIENT)
Dept: ENDOCRINOLOGY | Facility: CLINIC | Age: 45
End: 2020-10-29

## 2020-10-29 NOTE — TELEPHONE ENCOUNTER
No answer - VM full  ----- Message from YESICA Villanueva sent at 10/28/2020  5:00 PM CDT -----  Her thyroid was 7.5 I called in 88 mcg daily ; A1c is 10.10 r

## 2020-10-30 ENCOUNTER — TELEPHONE (OUTPATIENT)
Dept: ENDOCRINOLOGY | Facility: CLINIC | Age: 45
End: 2020-10-30

## 2020-10-30 NOTE — TELEPHONE ENCOUNTER
LM - PLEASE CALL BACK FOR RESULTS  ----- Message from YESICA Villanueva sent at 10/28/2020  5:00 PM CDT -----  Her thyroid was 7.5 I called in 88 mcg daily ; A1c is 10.10 r

## 2020-11-12 ENCOUNTER — TELEPHONE (OUTPATIENT)
Dept: ENDOCRINOLOGY | Facility: CLINIC | Age: 45
End: 2020-11-12

## 2020-11-12 NOTE — TELEPHONE ENCOUNTER
Pt aware. Increased basaglar from 32 to 34 units now and increase supper dose from 6 units to 8 un now. Pt verbalized understanding.

## 2021-03-04 ENCOUNTER — TELEPHONE (OUTPATIENT)
Dept: ENDOCRINOLOGY | Facility: CLINIC | Age: 46
End: 2021-03-04

## 2021-03-04 RX ORDER — INSULIN GLARGINE 100 [IU]/ML
INJECTION, SOLUTION SUBCUTANEOUS
Qty: 3 PEN | Refills: 11 | Status: SHIPPED | OUTPATIENT
Start: 2021-03-04 | End: 2022-02-14 | Stop reason: SDUPTHER

## 2021-03-04 NOTE — TELEPHONE ENCOUNTER
Pt left a vm asking for a new script for     Insulin Glargine (BASAGLAR KWIKPEN) 100 UNIT/ML injection pen     to be sent into   [place] [address] AT [facility] [address] - [phone]  - [phone] FX

## 2021-04-14 ENCOUNTER — TELEPHONE (OUTPATIENT)
Dept: ENDOCRINOLOGY | Facility: CLINIC | Age: 46
End: 2021-04-14

## 2021-04-14 ENCOUNTER — HOSPITAL ENCOUNTER (EMERGENCY)
Facility: HOSPITAL | Age: 46
Discharge: HOME | End: 2021-04-14
Attending: EMERGENCY MEDICINE | Admitting: EMERGENCY MEDICINE

## 2021-04-14 ENCOUNTER — APPOINTMENT (OUTPATIENT)
Dept: GENERAL RADIOLOGY | Facility: HOSPITAL | Age: 46
End: 2021-04-14

## 2021-04-14 VITALS
HEIGHT: 66 IN | BODY MASS INDEX: 38.73 KG/M2 | HEART RATE: 85 BPM | WEIGHT: 241 LBS | OXYGEN SATURATION: 98 % | SYSTOLIC BLOOD PRESSURE: 146 MMHG | TEMPERATURE: 97.3 F | RESPIRATION RATE: 18 BRPM | DIASTOLIC BLOOD PRESSURE: 82 MMHG

## 2021-04-14 DIAGNOSIS — R73.9 HYPERGLYCEMIA: Primary | ICD-10-CM

## 2021-04-14 PROBLEM — K76.0 NONALCOHOLIC FATTY LIVER DISEASE: Status: ACTIVE | Noted: 2017-04-13

## 2021-04-14 PROBLEM — E07.9 THYROID DISEASE: Status: ACTIVE | Noted: 2020-08-03

## 2021-04-14 PROBLEM — G47.33 OSA (OBSTRUCTIVE SLEEP APNEA): Status: ACTIVE | Noted: 2020-08-28

## 2021-04-14 PROBLEM — K57.30 DIVERTICULOSIS OF COLON WITHOUT DIVERTICULITIS: Status: ACTIVE | Noted: 2017-03-21

## 2021-04-14 PROBLEM — E66.01 CLASS 3 SEVERE OBESITY DUE TO EXCESS CALORIES WITH SERIOUS COMORBIDITY AND BODY MASS INDEX (BMI) OF 40.0 TO 44.9 IN ADULT: Status: ACTIVE | Noted: 2020-08-03

## 2021-04-14 PROBLEM — K21.9 GASTROESOPHAGEAL REFLUX DISEASE: Status: ACTIVE | Noted: 2017-03-21

## 2021-04-14 PROBLEM — E66.813 CLASS 3 SEVERE OBESITY DUE TO EXCESS CALORIES WITH SERIOUS COMORBIDITY AND BODY MASS INDEX (BMI) OF 40.0 TO 44.9 IN ADULT: Status: ACTIVE | Noted: 2020-08-03

## 2021-04-14 LAB
ACETONE BLD QL: NEGATIVE
ALBUMIN SERPL-MCNC: 4 G/DL (ref 3.5–5.2)
ALBUMIN/GLOB SERPL: 1.1 G/DL
ALP SERPL-CCNC: 136 U/L (ref 39–117)
ALT SERPL W P-5'-P-CCNC: 66 U/L (ref 1–33)
ANION GAP SERPL CALCULATED.3IONS-SCNC: 8 MMOL/L (ref 5–15)
AST SERPL-CCNC: 69 U/L (ref 1–32)
BASOPHILS # BLD AUTO: 0.07 10*3/MM3 (ref 0–0.2)
BASOPHILS NFR BLD AUTO: 0.7 % (ref 0–1.5)
BILIRUB SERPL-MCNC: 0.4 MG/DL (ref 0–1.2)
BILIRUB UR QL STRIP: NEGATIVE
BUN SERPL-MCNC: 17 MG/DL (ref 6–20)
BUN/CREAT SERPL: 30.4 (ref 7–25)
CALCIUM SPEC-SCNC: 9.6 MG/DL (ref 8.6–10.5)
CHLORIDE SERPL-SCNC: 102 MMOL/L (ref 98–107)
CLARITY UR: CLEAR
CO2 SERPL-SCNC: 25 MMOL/L (ref 22–29)
COLOR UR: YELLOW
CREAT SERPL-MCNC: 0.56 MG/DL (ref 0.57–1)
DEPRECATED RDW RBC AUTO: 46.6 FL (ref 37–54)
EOSINOPHIL # BLD AUTO: 0.13 10*3/MM3 (ref 0–0.4)
EOSINOPHIL NFR BLD AUTO: 1.4 % (ref 0.3–6.2)
ERYTHROCYTE [DISTWIDTH] IN BLOOD BY AUTOMATED COUNT: 14.7 % (ref 12.3–15.4)
GFR SERPL CREATININE-BSD FRML MDRD: 118 ML/MIN/1.73
GLOBULIN UR ELPH-MCNC: 3.7 GM/DL
GLUCOSE BLDC GLUCOMTR-MCNC: 179 MG/DL (ref 70–130)
GLUCOSE BLDC GLUCOMTR-MCNC: 308 MG/DL (ref 70–130)
GLUCOSE SERPL-MCNC: 309 MG/DL (ref 65–99)
GLUCOSE UR STRIP-MCNC: ABNORMAL MG/DL
HCT VFR BLD AUTO: 45.3 % (ref 34–46.6)
HGB BLD-MCNC: 14.8 G/DL (ref 12–15.9)
HGB UR QL STRIP.AUTO: NEGATIVE
IMM GRANULOCYTES # BLD AUTO: 0.04 10*3/MM3 (ref 0–0.05)
IMM GRANULOCYTES NFR BLD AUTO: 0.4 % (ref 0–0.5)
KETONES UR QL STRIP: NEGATIVE
LEUKOCYTE ESTERASE UR QL STRIP.AUTO: NEGATIVE
LYMPHOCYTES # BLD AUTO: 2.69 10*3/MM3 (ref 0.7–3.1)
LYMPHOCYTES NFR BLD AUTO: 28.3 % (ref 19.6–45.3)
MAGNESIUM SERPL-MCNC: 1.9 MG/DL (ref 1.6–2.6)
MCH RBC QN AUTO: 28.2 PG (ref 26.6–33)
MCHC RBC AUTO-ENTMCNC: 32.7 G/DL (ref 31.5–35.7)
MCV RBC AUTO: 86.5 FL (ref 79–97)
MONOCYTES # BLD AUTO: 0.55 10*3/MM3 (ref 0.1–0.9)
MONOCYTES NFR BLD AUTO: 5.8 % (ref 5–12)
NEUTROPHILS NFR BLD AUTO: 6.02 10*3/MM3 (ref 1.7–7)
NEUTROPHILS NFR BLD AUTO: 63.4 % (ref 42.7–76)
NITRITE UR QL STRIP: NEGATIVE
NRBC BLD AUTO-RTO: 0 /100 WBC (ref 0–0.2)
NT-PROBNP SERPL-MCNC: <5 PG/ML (ref 0–450)
PH UR STRIP.AUTO: <=5 [PH] (ref 5–9)
PLATELET # BLD AUTO: 283 10*3/MM3 (ref 140–450)
PMV BLD AUTO: 11.3 FL (ref 6–12)
POTASSIUM SERPL-SCNC: 4.2 MMOL/L (ref 3.5–5.2)
PROT SERPL-MCNC: 7.7 G/DL (ref 6–8.5)
PROT UR QL STRIP: ABNORMAL
RBC # BLD AUTO: 5.24 10*6/MM3 (ref 3.77–5.28)
S PYO AG THROAT QL: NEGATIVE
SODIUM SERPL-SCNC: 135 MMOL/L (ref 136–145)
SP GR UR STRIP: 1.04 (ref 1–1.03)
TROPONIN T SERPL-MCNC: <0.01 NG/ML (ref 0–0.03)
TSH SERPL DL<=0.05 MIU/L-ACNC: 2.08 UIU/ML (ref 0.27–4.2)
UROBILINOGEN UR QL STRIP: ABNORMAL
WBC # BLD AUTO: 9.5 10*3/MM3 (ref 3.4–10.8)
WHOLE BLOOD HOLD SPECIMEN: NORMAL

## 2021-04-14 PROCEDURE — 85025 COMPLETE CBC W/AUTO DIFF WBC: CPT | Performed by: EMERGENCY MEDICINE

## 2021-04-14 PROCEDURE — 82962 GLUCOSE BLOOD TEST: CPT

## 2021-04-14 PROCEDURE — 63710000001 INSULIN REGULAR HUMAN PER 5 UNITS: Performed by: EMERGENCY MEDICINE

## 2021-04-14 PROCEDURE — 87081 CULTURE SCREEN ONLY: CPT | Performed by: EMERGENCY MEDICINE

## 2021-04-14 PROCEDURE — 87880 STREP A ASSAY W/OPTIC: CPT | Performed by: EMERGENCY MEDICINE

## 2021-04-14 PROCEDURE — 93005 ELECTROCARDIOGRAM TRACING: CPT | Performed by: EMERGENCY MEDICINE

## 2021-04-14 PROCEDURE — 93010 ELECTROCARDIOGRAM REPORT: CPT | Performed by: INTERNAL MEDICINE

## 2021-04-14 PROCEDURE — 83880 ASSAY OF NATRIURETIC PEPTIDE: CPT | Performed by: EMERGENCY MEDICINE

## 2021-04-14 PROCEDURE — 84443 ASSAY THYROID STIM HORMONE: CPT | Performed by: EMERGENCY MEDICINE

## 2021-04-14 PROCEDURE — 99284 EMERGENCY DEPT VISIT MOD MDM: CPT

## 2021-04-14 PROCEDURE — 84484 ASSAY OF TROPONIN QUANT: CPT | Performed by: EMERGENCY MEDICINE

## 2021-04-14 PROCEDURE — 81003 URINALYSIS AUTO W/O SCOPE: CPT | Performed by: EMERGENCY MEDICINE

## 2021-04-14 PROCEDURE — 80053 COMPREHEN METABOLIC PANEL: CPT | Performed by: EMERGENCY MEDICINE

## 2021-04-14 PROCEDURE — 83735 ASSAY OF MAGNESIUM: CPT | Performed by: EMERGENCY MEDICINE

## 2021-04-14 PROCEDURE — 82009 KETONE BODYS QUAL: CPT | Performed by: EMERGENCY MEDICINE

## 2021-04-14 PROCEDURE — 71045 X-RAY EXAM CHEST 1 VIEW: CPT

## 2021-04-14 RX ORDER — SODIUM CHLORIDE 0.9 % (FLUSH) 0.9 %
10 SYRINGE (ML) INJECTION AS NEEDED
Status: DISCONTINUED | OUTPATIENT
Start: 2021-04-14 | End: 2021-04-14 | Stop reason: HOSPADM

## 2021-04-14 RX ADMIN — HUMAN INSULIN 3 UNITS: 100 INJECTION, SOLUTION SUBCUTANEOUS at 19:03

## 2021-04-14 RX ADMIN — SODIUM CHLORIDE 1000 ML: 9 INJECTION, SOLUTION INTRAVENOUS at 16:27

## 2021-04-14 NOTE — TELEPHONE ENCOUNTER
Pt called stating that her sugar was running 360 last night at work. She is stating that now she is having blurred vision, and she is dizzy. She had to leave work early this morning, as she works third shift. She is asking for Chucky to advise if she needs to come in, and if she can write her an excuse to be off for another day.       (Pt was advised if this does get worse, she needs to go the ER.)

## 2021-04-14 NOTE — TELEPHONE ENCOUNTER
Spoke with pt. Instructed to increase Basaglar. Currently she is taking 28un nightly - increase to 32 units. On average with meals takes 6-8 units - instructed to increase 1-2 units. Pt verbalized understanding and stated she was going to go to clinic/urgent care to just make sure nothing else was going on. Explained infections can cause high sugars as well so always best to get checked out.

## 2021-04-14 NOTE — ED PROVIDER NOTES
Subjective   45-year-old female presents the emergency department with complaint of elevated blood glucose. She went to urgent care and was told that they could not do any labs and that she needed those and was sent here. She does have history of diabetes as well as hyperlipidemia and hypertension as well as migraines. No recent changes to her diabetes medications but she did recently start 2 new migraine medications. Denies any recent illness. She reports that she knew her blood glucose was elevated because she was having blurry vision, headache and lightheadedness. Reported glucose at home was just over 300.    Family history, surgical history, social history, current medications and allergies are reviewed with the patient and triage documentation and vitals are reviewed.      History provided by:  Patient   used: No        Review of Systems   Constitutional: Negative for chills and fever.   HENT: Negative for congestion and sore throat.    Eyes: Positive for visual disturbance. Negative for photophobia.   Respiratory: Negative for cough, shortness of breath and wheezing.    Cardiovascular: Negative for chest pain, palpitations and leg swelling.   Gastrointestinal: Positive for nausea. Negative for abdominal pain, constipation, diarrhea and vomiting.   Endocrine: Negative for polydipsia, polyphagia and polyuria.   Genitourinary: Negative for dysuria, frequency, pelvic pain and urgency.   Musculoskeletal: Negative for arthralgias, back pain, myalgias and neck pain.   Skin: Negative for rash and wound.   Allergic/Immunologic: Negative.    Neurological: Positive for light-headedness and headaches. Negative for dizziness, facial asymmetry, speech difficulty, weakness and numbness.   Hematological: Negative.    Psychiatric/Behavioral: Negative.        Past Medical History:   Diagnosis Date   • Abnormal liver enzymes    • Acute serous otitis media    • Adult body mass index 30+     obesity   •  Dysfunctional uterine bleeding    • Fatty liver    • Hypercholesteremia    • Hypertension associated with diabetes (CMS/[facility]) 2/4/2020   • Hypothyroidism due to Hashimoto's thyroiditis 2/4/2020   • Migraine    • Migraine     tension type headache vs migraine      • Mixed diabetic hyperlipidemia associated with type 2 diabetes mellitus (CMS/[facility]) 2/4/2020   • Morbid (severe) obesity due to excess calories (CMS/HCC)     BMI 41   • Nausea, vomiting and diarrhea    • Non-alcoholic fatty liver disease    • Type 2 diabetes mellitus (CMS/HCC)     A1C 7.4   • Type 2 diabetes mellitus with hyperglycemia, with long-term current use of insulin (CMS/HCC) 2/4/2020       Allergies   Allergen Reactions   • Caffeine        Past Surgical History:   Procedure Laterality Date   • CHOLECYSTECTOMY     • INJECTION OF MEDICATION  02/18/2014    Phenergan (2)      • INJECTION OF MEDICATION  02/24/2014    Toradol (2)    • INJECTION OF MEDICATION  03/09/2014    Zofran (2)          Family History   Family history unknown: Yes       Social History     Socioeconomic History   • Marital status:      Spouse name: Not on file   • Number of children: Not on file   • Years of education: Not on file   • Highest education level: Not on file   Tobacco Use   • Smoking status: Never Smoker   • Smokeless tobacco: Never Used   Substance and Sexual Activity   • Alcohol use: No   • Drug use: No   • Sexual activity: Defer           Objective   Physical Exam  Vitals and nursing note reviewed.   Constitutional:       General: She is not in acute distress.     Appearance: Normal appearance. She is not ill-appearing, toxic-appearing or diaphoretic.   HENT:      Head: Normocephalic.      Mouth/Throat:      Mouth: Mucous membranes are moist.      Pharynx: Oropharynx is clear.   Eyes:      General: No scleral icterus.     Conjunctiva/sclera: Conjunctivae normal.      Pupils: Pupils are equal, round, and reactive to light.   Cardiovascular:      Rate and Rhythm:  Normal rate and regular rhythm.      Pulses: Normal pulses.   Pulmonary:      Effort: Pulmonary effort is normal. No respiratory distress.      Breath sounds: Normal breath sounds. No wheezing or rhonchi.   Abdominal:      General: Bowel sounds are normal.      Palpations: Abdomen is soft.   Musculoskeletal:         General: No swelling or tenderness. Normal range of motion.      Cervical back: Normal range of motion.      Right lower leg: No edema.      Left lower leg: No edema.   Skin:     General: Skin is warm and dry.      Capillary Refill: Capillary refill takes less than 2 seconds.   Neurological:      General: No focal deficit present.      Mental Status: She is alert and oriented to person, place, and time.      Motor: No weakness.   Psychiatric:         Mood and Affect: Mood normal.         Behavior: Behavior normal.         Procedures  none         ED Course      Labs Reviewed   COMPREHENSIVE METABOLIC PANEL - Abnormal; Notable for the following components:       Result Value    Glucose 309 (*)     Creatinine 0.56 (*)     Sodium 135 (*)     ALT (SGPT) 66 (*)     AST (SGOT) 69 (*)     Alkaline Phosphatase 136 (*)     BUN/Creatinine Ratio 30.4 (*)     All other components within normal limits    Narrative:     GFR Normal >60  Chronic Kidney Disease <60  Kidney Failure <15     URINALYSIS W/ MICROSCOPIC IF INDICATED (NO CULTURE) - Abnormal; Notable for the following components:    Specific Gravity, UA 1.043 (*)     Glucose, UA >=1000 mg/dL (3+) (*)     Protein, UA Trace (*)     All other components within normal limits    Narrative:     Urine microscopic not indicated.   POCT GLUCOSE FINGERSTICK - Abnormal; Notable for the following components:    Glucose 308 (*)     All other components within normal limits   POCT GLUCOSE FINGERSTICK - Abnormal; Notable for the following components:    Glucose 179 (*)     All other components within normal limits   RAPID STREP A SCREEN - Normal   MAGNESIUM - Normal   TSH -  Normal   BNP (IN-HOUSE) - Normal    Narrative:     Among patients with dyspnea, NT-proBNP is highly sensitive for the detection of acute congestive heart failure. In addition NT-proBNP of <300 pg/ml effectively rules out acute congestive heart failure with 99% negative predictive value.    Results may be falsely decreased if patient taking Biotin.     TROPONIN (IN-HOUSE) - Normal    Narrative:     Troponin T Reference Range:  <= 0.03 ng/mL-   Negative for AMI  >0.03 ng/mL-     Abnormal for myocardial necrosis.  Clinicians would have to utilize clinical acumen, EKG, Troponin and serial changes to determine if it is an Acute Myocardial Infarction or myocardial injury due to an underlying chronic condition.       Results may be falsely decreased if patient taking Biotin.     CBC WITH AUTO DIFFERENTIAL - Normal   ACETONE - Normal   BETA HEMOLYTIC STREP CULTURE, THROAT   CBC AND DIFFERENTIAL    Narrative:     The following orders were created for panel order CBC & Differential.  Procedure                               Abnormality         Status                     ---------                               -----------         ------                     CBC Auto Differential[[id number]]        Normal              Final result                 Please view results for these tests on the individual orders.   KETONE BODIES SERUM    Narrative:     The following orders were created for panel order Ketone Bodies, Serum (Not performed at [facility]).  Procedure                               Abnormality         Status                     ---------                               -----------         ------                     Acetone[[id number]]                      Normal              Final result                 Please view results for these tests on the individual orders.   EXTRA TUBES    Narrative:     The following orders were created for panel order Extra Tubes.  Procedure                               Abnormality         Status                      ---------                               -----------         ------                     Light Blue Top[[id number]]                                   Final result                 Please view results for these tests on the individual orders.   LIGHT BLUE TOP     XR Chest 1 View    Result Date: 4/14/2021  Narrative: PORTABLE CHEST HISTORY: Hyperglycemia. Left-sided chest pain. Portable AP upright film of the chest was obtained at 4:02 PM. COMPARISON: None FINDINGS: The lungs are clear of an acute process. The heart is not enlarged. The pulmonary vasculature is not increased. No pleural effusion. No pneumothorax. No acute osseous abnormality. Degenerative changes are present in the thoracic spine. Small left cervical rib.     Impression: CONCLUSION: No Acute Disease [id number] Electronically signed by:  Ean De Paz MD  4/14/2021 4:47 PM CDT Workstation: [id number]    EKG April 14, 2021 at 1633 reveals normal sinus rhythm rate 88 bpm.  No T wave flattening or inversion.  No ST elevation or depression.  No evidence of acute ischemia.  .          MDM  Number of Diagnoses or Management Options     Amount and/or Complexity of Data Reviewed  Clinical lab tests: reviewed  Tests in the radiology section of CPT®: reviewed  Tests in the medicine section of CPT®: reviewed  Decide to obtain previous medical records or to obtain history from someone other than the patient: yes    Patient Progress  Patient progress: stable    Glucose is improved to 179. Patient feeling somewhat better. Work-up unremarkable for source of infection or other causes of patient's hyperglycemia. Advised on close follow-up with primary. No focal neurologic deficit.    Final diagnoses:   Hyperglycemia       ED Disposition  ED Disposition     ED Disposition Condition Comment    Discharge Stable           Lisa Veras MD  [address] [address]  [phone]               Medication List      No changes were made to  your prescriptions during this visit.          John Haq,   04/14/21 3923

## 2021-04-15 LAB
QT INTERVAL: 372 MS
QTC INTERVAL: 450 MS

## 2021-04-15 NOTE — DISCHARGE INSTRUCTIONS
Please return with new or worsening symptoms.  Follow-up with primary care as discussed for further management of your elevated glucose levels.

## 2021-04-16 LAB — BACTERIA SPEC AEROBE CULT: NORMAL

## 2021-05-11 ENCOUNTER — LAB (OUTPATIENT)
Dept: LAB | Facility: HOSPITAL | Age: 46
End: 2021-05-11

## 2021-05-11 ENCOUNTER — OFFICE VISIT (OUTPATIENT)
Dept: ENDOCRINOLOGY | Facility: CLINIC | Age: 46
End: 2021-05-11

## 2021-05-11 VITALS
WEIGHT: 241.9 LBS | BODY MASS INDEX: 38.87 KG/M2 | HEIGHT: 66 IN | DIASTOLIC BLOOD PRESSURE: 86 MMHG | HEART RATE: 96 BPM | OXYGEN SATURATION: 99 % | SYSTOLIC BLOOD PRESSURE: 126 MMHG

## 2021-05-11 DIAGNOSIS — E11.65 TYPE 2 DIABETES MELLITUS WITH HYPERGLYCEMIA, WITH LONG-TERM CURRENT USE OF INSULIN (HCC): Primary | ICD-10-CM

## 2021-05-11 DIAGNOSIS — I15.2 HYPERTENSION ASSOCIATED WITH DIABETES (HCC): ICD-10-CM

## 2021-05-11 DIAGNOSIS — E06.3 HYPOTHYROIDISM DUE TO HASHIMOTO'S THYROIDITIS: ICD-10-CM

## 2021-05-11 DIAGNOSIS — Z79.4 TYPE 2 DIABETES MELLITUS WITH HYPERGLYCEMIA, WITH LONG-TERM CURRENT USE OF INSULIN (HCC): Primary | ICD-10-CM

## 2021-05-11 DIAGNOSIS — E55.9 VITAMIN D DEFICIENCY: ICD-10-CM

## 2021-05-11 DIAGNOSIS — E11.69 MIXED DIABETIC HYPERLIPIDEMIA ASSOCIATED WITH TYPE 2 DIABETES MELLITUS (HCC): ICD-10-CM

## 2021-05-11 DIAGNOSIS — E78.2 MIXED DIABETIC HYPERLIPIDEMIA ASSOCIATED WITH TYPE 2 DIABETES MELLITUS (HCC): ICD-10-CM

## 2021-05-11 DIAGNOSIS — E11.59 HYPERTENSION ASSOCIATED WITH DIABETES (HCC): ICD-10-CM

## 2021-05-11 DIAGNOSIS — E03.8 HYPOTHYROIDISM DUE TO HASHIMOTO'S THYROIDITIS: ICD-10-CM

## 2021-05-11 LAB
25(OH)D3 SERPL-MCNC: 36.6 NG/ML
ALBUMIN SERPL-MCNC: 4 G/DL (ref 3.5–5.2)
ALBUMIN UR-MCNC: 8.4 MG/DL
ALBUMIN/GLOB SERPL: 1.1 G/DL
ALP SERPL-CCNC: 106 U/L (ref 39–117)
ALT SERPL W P-5'-P-CCNC: 39 U/L (ref 1–33)
ANION GAP SERPL CALCULATED.3IONS-SCNC: 9.3 MMOL/L (ref 5–15)
AST SERPL-CCNC: 45 U/L (ref 1–32)
BASOPHILS # BLD AUTO: 0.05 10*3/MM3 (ref 0–0.2)
BASOPHILS NFR BLD AUTO: 0.7 % (ref 0–1.5)
BILIRUB SERPL-MCNC: 0.3 MG/DL (ref 0–1.2)
BUN SERPL-MCNC: 9 MG/DL (ref 6–20)
BUN/CREAT SERPL: 22 (ref 7–25)
CALCIUM SPEC-SCNC: 9.4 MG/DL (ref 8.6–10.5)
CHLORIDE SERPL-SCNC: 102 MMOL/L (ref 98–107)
CHOLEST SERPL-MCNC: 247 MG/DL (ref 0–200)
CO2 SERPL-SCNC: 25.7 MMOL/L (ref 22–29)
CREAT SERPL-MCNC: 0.41 MG/DL (ref 0.57–1)
CREAT UR-MCNC: 182.7 MG/DL
CREAT UR-MCNC: 182.7 MG/DL
DEPRECATED RDW RBC AUTO: 42.6 FL (ref 37–54)
EOSINOPHIL # BLD AUTO: 0.12 10*3/MM3 (ref 0–0.4)
EOSINOPHIL NFR BLD AUTO: 1.6 % (ref 0.3–6.2)
ERYTHROCYTE [DISTWIDTH] IN BLOOD BY AUTOMATED COUNT: 14.3 % (ref 12.3–15.4)
GFR SERPL CREATININE-BSD FRML MDRD: >150 ML/MIN/1.73
GLOBULIN UR ELPH-MCNC: 3.5 GM/DL
GLUCOSE SERPL-MCNC: 195 MG/DL (ref 65–99)
HBA1C MFR BLD: 9.5 % (ref 4.8–5.6)
HCT VFR BLD AUTO: 40 % (ref 34–46.6)
HDLC SERPL-MCNC: 40 MG/DL (ref 40–60)
HGB BLD-MCNC: 13.6 G/DL (ref 12–15.9)
IMM GRANULOCYTES # BLD AUTO: 0.07 10*3/MM3 (ref 0–0.05)
IMM GRANULOCYTES NFR BLD AUTO: 0.9 % (ref 0–0.5)
LDLC SERPL CALC-MCNC: 179 MG/DL (ref 0–100)
LDLC/HDLC SERPL: 4.41 {RATIO}
LYMPHOCYTES # BLD AUTO: 1.99 10*3/MM3 (ref 0.7–3.1)
LYMPHOCYTES NFR BLD AUTO: 26.8 % (ref 19.6–45.3)
MCH RBC QN AUTO: 28.5 PG (ref 26.6–33)
MCHC RBC AUTO-ENTMCNC: 34 G/DL (ref 31.5–35.7)
MCV RBC AUTO: 83.7 FL (ref 79–97)
MICROALBUMIN/CREAT UR: 46 MG/G
MONOCYTES # BLD AUTO: 0.37 10*3/MM3 (ref 0.1–0.9)
MONOCYTES NFR BLD AUTO: 5 % (ref 5–12)
NEUTROPHILS NFR BLD AUTO: 4.82 10*3/MM3 (ref 1.7–7)
NEUTROPHILS NFR BLD AUTO: 65 % (ref 42.7–76)
NRBC BLD AUTO-RTO: 0 /100 WBC (ref 0–0.2)
PLATELET # BLD AUTO: 287 10*3/MM3 (ref 140–450)
PMV BLD AUTO: 11.5 FL (ref 6–12)
POTASSIUM SERPL-SCNC: 4.5 MMOL/L (ref 3.5–5.2)
PROT SERPL-MCNC: 7.5 G/DL (ref 6–8.5)
PROT UR-MCNC: 32 MG/DL
PROT/CREAT UR: 175.2 MG/G CREA (ref 0–200)
RBC # BLD AUTO: 4.78 10*6/MM3 (ref 3.77–5.28)
SODIUM SERPL-SCNC: 137 MMOL/L (ref 136–145)
TRIGL SERPL-MCNC: 153 MG/DL (ref 0–150)
TSH SERPL DL<=0.05 MIU/L-ACNC: 2.04 UIU/ML (ref 0.27–4.2)
VIT B12 BLD-MCNC: 777 PG/ML (ref 211–946)
VLDLC SERPL-MCNC: 28 MG/DL (ref 5–40)
WBC # BLD AUTO: 7.42 10*3/MM3 (ref 3.4–10.8)

## 2021-05-11 PROCEDURE — 80053 COMPREHEN METABOLIC PANEL: CPT | Performed by: NURSE PRACTITIONER

## 2021-05-11 PROCEDURE — 82043 UR ALBUMIN QUANTITATIVE: CPT | Performed by: NURSE PRACTITIONER

## 2021-05-11 PROCEDURE — 82607 VITAMIN B-12: CPT | Performed by: NURSE PRACTITIONER

## 2021-05-11 PROCEDURE — 80061 LIPID PANEL: CPT | Performed by: NURSE PRACTITIONER

## 2021-05-11 PROCEDURE — 83036 HEMOGLOBIN GLYCOSYLATED A1C: CPT | Performed by: NURSE PRACTITIONER

## 2021-05-11 PROCEDURE — 82570 ASSAY OF URINE CREATININE: CPT | Performed by: NURSE PRACTITIONER

## 2021-05-11 PROCEDURE — 36415 COLL VENOUS BLD VENIPUNCTURE: CPT | Performed by: NURSE PRACTITIONER

## 2021-05-11 PROCEDURE — 82306 VITAMIN D 25 HYDROXY: CPT | Performed by: NURSE PRACTITIONER

## 2021-05-11 PROCEDURE — 99214 OFFICE O/P EST MOD 30 MIN: CPT | Performed by: NURSE PRACTITIONER

## 2021-05-11 PROCEDURE — 84443 ASSAY THYROID STIM HORMONE: CPT | Performed by: NURSE PRACTITIONER

## 2021-05-11 PROCEDURE — 85025 COMPLETE CBC W/AUTO DIFF WBC: CPT | Performed by: NURSE PRACTITIONER

## 2021-05-11 PROCEDURE — 84156 ASSAY OF PROTEIN URINE: CPT | Performed by: NURSE PRACTITIONER

## 2021-05-11 RX ORDER — MILK THISTLE 150 MG
1 CAPSULE ORAL DAILY
COMMUNITY

## 2021-05-11 RX ORDER — EMPAGLIFLOZIN 25 MG/1
1 TABLET, FILM COATED ORAL DAILY
Qty: 30 TABLET | Refills: 11 | Status: SHIPPED | OUTPATIENT
Start: 2021-05-11 | End: 2022-02-14 | Stop reason: SDUPTHER

## 2021-05-11 NOTE — PROGRESS NOTES
"Chief Complaint  Diabetes    Subjective          Diane Esquivel presents to [facility] ENDOCRINOLOGY  History of Present Illness           In office visit       Referring provider     Primary Care Provider     Lisa Veras MD     45 year old female presents for follow up        Reason diabetes mellitus type 2     Timing constant     Duration diagnosed in 2010           Quality - not controlled      Severity -  moderate       Microvascular complications neuropathy        Current symptoms/problems  hyperglycemia in the am      Alleviating Factors: Compliance       Side Effects  could not tolerate GLP-1      Current diet  regular      Current exercise none     Current monitoring regimen: home blood tests - checking 3 x daily               Lab Results   Component Value Date     HGBA1C 10.10 (H) 10/28/2020               Home blood sugar records:      This am was 226     Hypoglycemia denies any lows      Review of Systems - General ROS: positive for  - fatigue        Objective   Vital Signs:   /86   Pulse 96   Ht 167.6 cm (66\")   Wt 110 kg (241 lb 14.4 oz)   SpO2 99%   BMI 39.04 kg/m²     Physical Exam  Constitutional:       Appearance: Normal appearance.   HENT:      Head: Normocephalic.   Cardiovascular:      Rate and Rhythm: Regular rhythm.      Heart sounds: Normal heart sounds.   Pulmonary:      Breath sounds: Normal breath sounds.   Musculoskeletal:         General: Normal range of motion.      Cervical back: Normal range of motion.   Skin:     Coloration: Skin is not pale.   Neurological:      General: No focal deficit present.      Mental Status: She is alert.   Psychiatric:         Mood and Affect: Mood normal.         Thought Content: Thought content normal.         Judgment: Judgment normal.        Result Review :   The following data was reviewed by: YESICA Villanueva on 05/11/2021:  Common labs    Common Labsle 5/28/20 5/28/20 5/28/20 5/28/20 5/28/20 10/28/20 " 10/28/20 10/28/20 10/28/20 10/28/20 4/14/21 4/14/21    0836 0836 0836 0836 0836 0834 0834 0834 0834 0834 1628 1628   Glucose    243 (A)    227 (A)    309 (A)   BUN    11    7    17   Creatinine    0.62    0.39 (A)    0.56 (A)   eGFR Non African Am    106    >150    118   Sodium    135 (A)    137    135 (A)   Potassium    4.5    4.0    4.2   Chloride    102    103    102   Calcium    9.5    9.3    9.6   Albumin    4.10    4.00    4.00   Total Bilirubin    0.5    0.3    0.4   Alkaline Phosphatase    112    113    136 (A)   AST (SGOT)    52 (A)    32    69 (A)   ALT (SGPT)    45 (A)    34 (A)    66 (A)   WBC 10.69      8.23    9.50    Hemoglobin 13.5      13.0    14.8    Hematocrit 40.4      38.8    45.3    Platelets 308      290    283    Total Cholesterol     195    199      Triglycerides     211 (A)    162 (A)      HDL Cholesterol     33 (A)    35 (A)      LDL Cholesterol      120 (A)    135 (A)      Hemoglobin A1C  9.34 (A)    10.10 (A)         Microalbumin, Urine   9.5       5.8     (A) Abnormal value                      Assessment and Plan    Diagnoses and all orders for this visit:    1. Type 2 diabetes mellitus with hyperglycemia, with long-term current use of insulin (CMS/[facility]) (Primary)  -     CBC & Differential  -     Comprehensive Metabolic Panel  -     Hemoglobin A1c  -     Lipid Panel  -     Microalbumin / Creatinine Urine Ratio - Urine, Clean Catch  -     Protein / Creatinine Ratio, Urine - Urine, Clean Catch  -     TSH  -     Vitamin B12  -     Vitamin D 25 Hydroxy    2. Hypertension associated with diabetes (CMS/[facility])  -     CBC & Differential  -     Comprehensive Metabolic Panel  -     Hemoglobin A1c  -     Lipid Panel  -     Microalbumin / Creatinine Urine Ratio - Urine, Clean Catch  -     Protein / Creatinine Ratio, Urine - Urine, Clean Catch  -     TSH  -     Vitamin B12  -     Vitamin D 25 Hydroxy    3. Mixed diabetic hyperlipidemia associated with type 2 diabetes mellitus (CMS/[facility])  -     CBC &  Differential  -     Comprehensive Metabolic Panel  -     Hemoglobin A1c  -     Lipid Panel  -     Microalbumin / Creatinine Urine Ratio - Urine, Clean Catch  -     Protein / Creatinine Ratio, Urine - Urine, Clean Catch  -     TSH  -     Vitamin B12  -     Vitamin D 25 Hydroxy    4. Hypothyroidism due to Hashimoto's thyroiditis  -     CBC & Differential  -     Comprehensive Metabolic Panel  -     Hemoglobin A1c  -     Lipid Panel  -     Microalbumin / Creatinine Urine Ratio - Urine, Clean Catch  -     Protein / Creatinine Ratio, Urine - Urine, Clean Catch  -     TSH  -     Vitamin B12  -     Vitamin D 25 Hydroxy    5. Vitamin D deficiency  -     CBC & Differential  -     Comprehensive Metabolic Panel  -     Hemoglobin A1c  -     Lipid Panel  -     Microalbumin / Creatinine Urine Ratio - Urine, Clean Catch  -     Protein / Creatinine Ratio, Urine - Urine, Clean Catch  -     TSH  -     Vitamin B12  -     Vitamin D 25 Hydroxy    Other orders  -     SITagliptin (Januvia) 100 MG tablet; 100 mg by mouth  daily before breakfast  Dispense: 30 tablet; Refill: 11  -     Empagliflozin (Jardiance) 25 MG tablet; Take 25 mg by mouth Daily. One tablet daily before breakfast  Dispense: 30 tablet; Refill: 11  -     glucose blood test strip; Test 3 times daily, E11.9  Dispense: 100 each; Refill: 11             Glycemic Management:       diabetes mellitus type 2 with hyperglycemia      Lab Results   Component Value Date    HGBA1C 10.10 (H) 10/28/2020         Taking Basaglar 30 units once daily --- increase to 34 units         taking Januvia 100 mg one daily          trulicity 0.75 mg weekly  --stop due to nausea                Synjardy makes sick had to stop          Actos 15 mg daily due to fatty liver --stop      Watch fluid accumulation         Jardiance 10 mg once daily --increase to 25 mg one daily         Humalog before each meal         Taking 10 units TID before each meal -- increase to 14 units      Blood sugar               Insulin      200-250                    2 units  251-300                    3 units  301-350                    4 units   Above 351                5 units         Stop glipizide        Lipid Management                  Total Cholesterol   Date Value Ref Range Status   05/28/2020 195 0 - 200 mg/dL Final                Triglycerides   Date Value Ref Range Status   05/28/2020 211 (H) 0 - 150 mg/dL Final                HDL Cholesterol   Date Value Ref Range Status   05/28/2020 33 (L) 40 - 60 mg/dL Final                LDL Cholesterol    Date Value Ref Range Status   05/28/2020 120 (H) 0 - 100 mg/dL Final            Taking lipitor 40 mg qhs           Blood Pressure Management:       At goal       not on ACEi              Microvascular Complication Monitoring:       Last eye exam --- overdue      -----------     Last Microalbumin-Proteinuria Assessment                     Lab Results   Component Value Date     MICROALBUR 9.5 05/28/2020            Neuropathy     Taking elavil 50 mg                  Preventive Care:      Nonsmoker          Weight Related:      Patient's Body mass index is 39.04 kg/m². indicating that she is obese (BMI >30). Obesity-related health conditions include the following: diabetes mellitus. Obesity is unchanged. BMI is is above average; no BMI management plan is appropriate. We discussed portion control and increasing exercise..          Bone Health           Component      Latest Ref Rng & Units 5/28/2020   25 Hydroxy, Vitamin D      30.0 - 100.0 ng/ml 25.7 (L)          taking vitamin d supplement -- keep            Thyroid Health   hypothyroidism      Taking levothyroxine 50 mcg take 1 1/2 daily      Lab Results   Component Value Date    TSH 2.080 04/14/2021             Other Diabetes Related Aspects      Lab Results   Component Value Date    [id number] 10/28/2020        diagnosed with sleep apnea     using CPAP              Follow Up   No follow-ups on file.  Patient was given instructions  and counseling regarding her condition or for health maintenance advice. Please see specific information pulled into the AVS if appropriate.

## 2021-05-19 ENCOUNTER — DOCUMENTATION (OUTPATIENT)
Dept: ENDOCRINOLOGY | Facility: CLINIC | Age: 46
End: 2021-05-19

## 2021-08-09 ENCOUNTER — TELEPHONE (OUTPATIENT)
Dept: ENDOCRINOLOGY | Facility: CLINIC | Age: 46
End: 2021-08-09

## 2021-08-09 RX ORDER — INSULIN LISPRO 100 [IU]/ML
INJECTION, SOLUTION INTRAVENOUS; SUBCUTANEOUS
Qty: 6 PEN | Refills: 11 | Status: SHIPPED | OUTPATIENT
Start: 2021-08-09 | End: 2022-02-14 | Stop reason: SDUPTHER

## 2021-08-11 ENCOUNTER — OFFICE VISIT (OUTPATIENT)
Dept: ENDOCRINOLOGY | Facility: CLINIC | Age: 46
End: 2021-08-11

## 2021-08-11 ENCOUNTER — LAB (OUTPATIENT)
Dept: LAB | Facility: HOSPITAL | Age: 46
End: 2021-08-11

## 2021-08-11 VITALS
OXYGEN SATURATION: 99 % | BODY MASS INDEX: 39.87 KG/M2 | HEIGHT: 66 IN | WEIGHT: 248.1 LBS | DIASTOLIC BLOOD PRESSURE: 88 MMHG | HEART RATE: 90 BPM | SYSTOLIC BLOOD PRESSURE: 120 MMHG

## 2021-08-11 DIAGNOSIS — I15.2 HYPERTENSION ASSOCIATED WITH DIABETES (HCC): ICD-10-CM

## 2021-08-11 DIAGNOSIS — E03.8 HYPOTHYROIDISM DUE TO HASHIMOTO'S THYROIDITIS: ICD-10-CM

## 2021-08-11 DIAGNOSIS — Z79.4 TYPE 2 DIABETES MELLITUS WITH HYPERGLYCEMIA, WITH LONG-TERM CURRENT USE OF INSULIN (HCC): Primary | ICD-10-CM

## 2021-08-11 DIAGNOSIS — E11.69 MIXED DIABETIC HYPERLIPIDEMIA ASSOCIATED WITH TYPE 2 DIABETES MELLITUS (HCC): ICD-10-CM

## 2021-08-11 DIAGNOSIS — E11.65 TYPE 2 DIABETES MELLITUS WITH HYPERGLYCEMIA, WITH LONG-TERM CURRENT USE OF INSULIN (HCC): Primary | ICD-10-CM

## 2021-08-11 DIAGNOSIS — E06.3 HYPOTHYROIDISM DUE TO HASHIMOTO'S THYROIDITIS: ICD-10-CM

## 2021-08-11 DIAGNOSIS — E11.59 HYPERTENSION ASSOCIATED WITH DIABETES (HCC): ICD-10-CM

## 2021-08-11 DIAGNOSIS — E78.2 MIXED DIABETIC HYPERLIPIDEMIA ASSOCIATED WITH TYPE 2 DIABETES MELLITUS (HCC): ICD-10-CM

## 2021-08-11 LAB
25(OH)D3 SERPL-MCNC: 30.1 NG/ML
ALBUMIN SERPL-MCNC: 4 G/DL (ref 3.5–5.2)
ALBUMIN UR-MCNC: 15.8 MG/DL
ALBUMIN/GLOB SERPL: 1.1 G/DL
ALP SERPL-CCNC: 109 U/L (ref 39–117)
ALT SERPL W P-5'-P-CCNC: 56 U/L (ref 1–33)
ANION GAP SERPL CALCULATED.3IONS-SCNC: 12.8 MMOL/L (ref 5–15)
AST SERPL-CCNC: 61 U/L (ref 1–32)
BASOPHILS # BLD AUTO: 0.06 10*3/MM3 (ref 0–0.2)
BASOPHILS NFR BLD AUTO: 0.7 % (ref 0–1.5)
BILIRUB SERPL-MCNC: 0.3 MG/DL (ref 0–1.2)
BUN SERPL-MCNC: 12 MG/DL (ref 6–20)
BUN/CREAT SERPL: 24 (ref 7–25)
CALCIUM SPEC-SCNC: 9.3 MG/DL (ref 8.6–10.5)
CHLORIDE SERPL-SCNC: 103 MMOL/L (ref 98–107)
CHOLEST SERPL-MCNC: 243 MG/DL (ref 0–200)
CO2 SERPL-SCNC: 20.2 MMOL/L (ref 22–29)
CREAT SERPL-MCNC: 0.5 MG/DL (ref 0.57–1)
CREAT UR-MCNC: 178 MG/DL
CREAT UR-MCNC: 182 MG/DL
DEPRECATED RDW RBC AUTO: 43.1 FL (ref 37–54)
EOSINOPHIL # BLD AUTO: 0.13 10*3/MM3 (ref 0–0.4)
EOSINOPHIL NFR BLD AUTO: 1.6 % (ref 0.3–6.2)
ERYTHROCYTE [DISTWIDTH] IN BLOOD BY AUTOMATED COUNT: 14.3 % (ref 12.3–15.4)
GFR SERPL CREATININE-BSD FRML MDRD: 135 ML/MIN/1.73
GLOBULIN UR ELPH-MCNC: 3.8 GM/DL
GLUCOSE SERPL-MCNC: 292 MG/DL (ref 65–99)
HBA1C MFR BLD: 9.2 % (ref 4.8–5.6)
HCT VFR BLD AUTO: 43.5 % (ref 34–46.6)
HDLC SERPL-MCNC: 38 MG/DL (ref 40–60)
HGB BLD-MCNC: 14.5 G/DL (ref 12–15.9)
IMM GRANULOCYTES # BLD AUTO: 0.07 10*3/MM3 (ref 0–0.05)
IMM GRANULOCYTES NFR BLD AUTO: 0.8 % (ref 0–0.5)
LDLC SERPL CALC-MCNC: 174 MG/DL (ref 0–100)
LDLC/HDLC SERPL: 4.52 {RATIO}
LYMPHOCYTES # BLD AUTO: 2.2 10*3/MM3 (ref 0.7–3.1)
LYMPHOCYTES NFR BLD AUTO: 26.3 % (ref 19.6–45.3)
MCH RBC QN AUTO: 28.3 PG (ref 26.6–33)
MCHC RBC AUTO-ENTMCNC: 33.3 G/DL (ref 31.5–35.7)
MCV RBC AUTO: 85 FL (ref 79–97)
MICROALBUMIN/CREAT UR: 88.8 MG/G
MONOCYTES # BLD AUTO: 0.52 10*3/MM3 (ref 0.1–0.9)
MONOCYTES NFR BLD AUTO: 6.2 % (ref 5–12)
NEUTROPHILS NFR BLD AUTO: 5.38 10*3/MM3 (ref 1.7–7)
NEUTROPHILS NFR BLD AUTO: 64.4 % (ref 42.7–76)
NRBC BLD AUTO-RTO: 0 /100 WBC (ref 0–0.2)
PLATELET # BLD AUTO: 277 10*3/MM3 (ref 140–450)
PMV BLD AUTO: 11.2 FL (ref 6–12)
POTASSIUM SERPL-SCNC: 4.5 MMOL/L (ref 3.5–5.2)
PROT SERPL-MCNC: 7.8 G/DL (ref 6–8.5)
PROT UR-MCNC: 50 MG/DL
PROT/CREAT UR: 274.7 MG/G CREA (ref 0–200)
RBC # BLD AUTO: 5.12 10*6/MM3 (ref 3.77–5.28)
SODIUM SERPL-SCNC: 136 MMOL/L (ref 136–145)
TRIGL SERPL-MCNC: 167 MG/DL (ref 0–150)
VIT B12 BLD-MCNC: 883 PG/ML (ref 211–946)
VLDLC SERPL-MCNC: 31 MG/DL (ref 5–40)
WBC # BLD AUTO: 8.36 10*3/MM3 (ref 3.4–10.8)

## 2021-08-11 PROCEDURE — 84443 ASSAY THYROID STIM HORMONE: CPT | Performed by: NURSE PRACTITIONER

## 2021-08-11 PROCEDURE — 83036 HEMOGLOBIN GLYCOSYLATED A1C: CPT | Performed by: NURSE PRACTITIONER

## 2021-08-11 PROCEDURE — 82306 VITAMIN D 25 HYDROXY: CPT | Performed by: NURSE PRACTITIONER

## 2021-08-11 PROCEDURE — 84156 ASSAY OF PROTEIN URINE: CPT | Performed by: NURSE PRACTITIONER

## 2021-08-11 PROCEDURE — 82043 UR ALBUMIN QUANTITATIVE: CPT | Performed by: NURSE PRACTITIONER

## 2021-08-11 PROCEDURE — 80053 COMPREHEN METABOLIC PANEL: CPT | Performed by: NURSE PRACTITIONER

## 2021-08-11 PROCEDURE — 36415 COLL VENOUS BLD VENIPUNCTURE: CPT | Performed by: NURSE PRACTITIONER

## 2021-08-11 PROCEDURE — 99214 OFFICE O/P EST MOD 30 MIN: CPT | Performed by: NURSE PRACTITIONER

## 2021-08-11 PROCEDURE — 82570 ASSAY OF URINE CREATININE: CPT | Performed by: NURSE PRACTITIONER

## 2021-08-11 PROCEDURE — 85025 COMPLETE CBC W/AUTO DIFF WBC: CPT | Performed by: NURSE PRACTITIONER

## 2021-08-11 PROCEDURE — 82607 VITAMIN B-12: CPT | Performed by: NURSE PRACTITIONER

## 2021-08-11 PROCEDURE — 80061 LIPID PANEL: CPT | Performed by: NURSE PRACTITIONER

## 2021-08-11 RX ORDER — BLOOD SUGAR DIAGNOSTIC
STRIP MISCELLANEOUS
Qty: 120 EACH | Refills: 11 | Status: SHIPPED | OUTPATIENT
Start: 2021-08-11

## 2021-08-11 NOTE — PROGRESS NOTES
"Chief Complaint  Diabetes    Subjective          Diane Esquivel presents to [facility] ENDOCRINOLOGY  History of Present Illness     In office visit      Primary care provider     Lisa Veras MD     45 year old female presents for follow up     Reason diabetes mellitus type 2     Duration diagnosed in 2010     Timing constant     Quality not controlled     Severity moderate       Macrovascular complications none     Microvascular complications neuropathy       Current monitoring regimen: home blood tests - checking 4  x daily         Lab Results   Component Value Date    HGBA1C 9.50 (H) 05/11/2021          Home blood sugar records:     Variable 100 up to 250          Review of Systems - General ROS: negative          Objective   Vital Signs:   /88   Pulse 90   Ht 167.6 cm (66\")   Wt 113 kg (248 lb 1.6 oz)   SpO2 99%   BMI 40.04 kg/m²     Physical Exam  Constitutional:       Appearance: Normal appearance.   Cardiovascular:      Rate and Rhythm: Regular rhythm.      Heart sounds: Normal heart sounds.   Pulmonary:      Breath sounds: Normal breath sounds.   Musculoskeletal:      Cervical back: Normal range of motion.   Neurological:      Mental Status: She is alert.        Result Review :   The following data was reviewed by: YESICA Villanueva on 08/11/2021:  Common labs    Common Labsle 10/28/20 10/28/20 10/28/20 10/28/20 10/28/20 4/14/21 4/14/21 5/11/21 5/11/21 5/11/21 5/11/21 5/11/21    0834 0834 0834 0834 0834 1628 1628 1115 1115 1115 1115 1115   Glucose   227 (A)    309 (A)    195 (A)    BUN   7    17    9    Creatinine   0.39 (A)    0.56 (A)    0.41 (A)    eGFR Non African Am   >150    118    >150    Sodium   137    135 (A)    137    Potassium   4.0    4.2    4.5    Chloride   103    102    102    Calcium   9.3    9.6    9.4    Albumin   4.00    4.00    4.00    Total Bilirubin   0.3    0.4    0.3    Alkaline Phosphatase   113    136 (A)    106    AST (SGOT)   32    69 " (A)    45 (A)    ALT (SGPT)   34 (A)    66 (A)    39 (A)    WBC  8.23    9.50  7.42       Hemoglobin  13.0    14.8  13.6       Hematocrit  38.8    45.3  40.0       Platelets  290    283  287       Total Cholesterol    199        247 (A)   Triglycerides    162 (A)        153 (A)   HDL Cholesterol    35 (A)        40   LDL Cholesterol     135 (A)        179 (A)   Hemoglobin A1C 10.10 (A)        9.50 (A)      Microalbumin, Urine     5.8     8.4     (A) Abnormal value                      Assessment and Plan    Diagnoses and all orders for this visit:    1. Type 2 diabetes mellitus with hyperglycemia, with long-term current use of insulin (CMS/[facility]) (Primary)  -     CBC & Differential  -     Comprehensive Metabolic Panel  -     Hemoglobin A1c  -     Lipid Panel  -     Microalbumin / Creatinine Urine Ratio - Urine, Clean Catch  -     Protein / Creatinine Ratio, Urine - Urine, Clean Catch  -     TSH  -     Vitamin B12  -     Vitamin D 25 Hydroxy    2. Hypertension associated with diabetes (CMS/[facility])  -     CBC & Differential  -     Comprehensive Metabolic Panel  -     Hemoglobin A1c  -     Lipid Panel  -     Microalbumin / Creatinine Urine Ratio - Urine, Clean Catch  -     Protein / Creatinine Ratio, Urine - Urine, Clean Catch  -     TSH  -     Vitamin B12  -     Vitamin D 25 Hydroxy    3. Mixed diabetic hyperlipidemia associated with type 2 diabetes mellitus (CMS/[facility])  -     CBC & Differential  -     Comprehensive Metabolic Panel  -     Hemoglobin A1c  -     Lipid Panel  -     Microalbumin / Creatinine Urine Ratio - Urine, Clean Catch  -     Protein / Creatinine Ratio, Urine - Urine, Clean Catch  -     TSH  -     Vitamin B12  -     Vitamin D 25 Hydroxy    4. Hypothyroidism due to Hashimoto's thyroiditis  -     CBC & Differential  -     Comprehensive Metabolic Panel  -     Hemoglobin A1c  -     Lipid Panel  -     Microalbumin / Creatinine Urine Ratio - Urine, Clean Catch  -     Protein / Creatinine Ratio, Urine - Urine,  Clean Catch  -     TSH  -     Vitamin B12  -     Vitamin D 25 Hydroxy    Other orders  -     Insulin Pen Needle ([employer] Insulin Pen Needles) 31G X 8 MM misc; Inject 4 times daily  Dispense: 120 each; Refill: 11           Glycemic Management:       diabetes mellitus type 2 with hyperglycemia      Lab Results   Component Value Date    HGBA1C 9.50 (H) 05/11/2021             Taking Basaglar  34  units once daily          taking Januvia 100 mg one daily          trulicity 0.75 mg weekly  --stop due to nausea                Synjardy makes sick had to stop          Actos 15 mg daily due to fatty liver --stop      Watch fluid accumulation         Jardiance taking 25 mg one daily         Humalog before each meal         Taking 12 units up to 16  units TID before each meal --      Blood sugar              Insulin      200-250                    2 units  251-300                    3 units  301-350                    4 units   Above 351                5 units         Stop glipizide        Lipid Management        Total Cholesterol   Date Value Ref Range Status   05/11/2021 247 (H) 0 - 200 mg/dL Final     Triglycerides   Date Value Ref Range Status   05/11/2021 153 (H) 0 - 150 mg/dL Final     HDL Cholesterol   Date Value Ref Range Status   05/11/2021 40 40 - 60 mg/dL Final     LDL Cholesterol    Date Value Ref Range Status   05/11/2021 179 (H) 0 - 100 mg/dL Final           Taking lipitor 40 mg qhs           Blood Pressure Management:       At goal       not on ACEi              Microvascular Complication Monitoring:       Last eye exam --- overdue      -----------     Last Microalbumin-Proteinuria Assessment                     Lab Results   Component Value Date     MICROALBUR 9.5 05/28/2020            Neuropathy     Taking elavil 50 mg                  Preventive Care:      Nonsmoker          Weight Related:      Patient's Body mass index is 40.04 kg/m². indicating that she is morbidly obese (BMI > 40 or > 35 with obesity -  related health condition). Obesity-related health conditions include the following: diabetes mellitus. Obesity is unchanged. BMI is is above average; no BMI management plan is appropriate. We discussed portion control and increasing exercise..          Bone Health           Component      Latest Ref Rng & Units 5/28/2020   25 Hydroxy, Vitamin D      30.0 - 100.0 ng/ml 25.7 (L)          taking vitamin d supplement -- keep            Thyroid Health   hypothyroidism      Taking levothyroxine 50 mcg take 1 1/2 daily      Lab Results   Component Value Date    TSH 2.040 05/11/2021             Other Diabetes Related Aspects      Lab Results   Component Value Date    [id number] 05/11/2021           diagnosed with sleep apnea      using CPAP                   Follow Up   Return in about 3 months (around 11/11/2021) for Recheck.  Patient was given instructions and counseling regarding her condition or for health maintenance advice. Please see specific information pulled into the AVS if appropriate.         This document has been electronically signed by YESICA Villanueva on August 11, 2021 09:53 CDT.

## 2021-08-12 LAB — TSH SERPL DL<=0.05 MIU/L-ACNC: 3.94 UIU/ML (ref 0.27–4.2)

## 2021-08-18 ENCOUNTER — TELEPHONE (OUTPATIENT)
Dept: ENDOCRINOLOGY | Facility: CLINIC | Age: 46
End: 2021-08-18

## 2021-10-16 ENCOUNTER — HOSPITAL ENCOUNTER (OUTPATIENT)
Facility: HOSPITAL | Age: 46
Discharge: HOME | End: 2021-10-17
Attending: FAMILY MEDICINE | Admitting: HOSPITALIST

## 2021-10-16 ENCOUNTER — APPOINTMENT (OUTPATIENT)
Dept: CT IMAGING | Facility: HOSPITAL | Age: 46
End: 2021-10-16

## 2021-10-16 ENCOUNTER — ANESTHESIA EVENT (OUTPATIENT)
Dept: PERIOP | Facility: HOSPITAL | Age: 46
End: 2021-10-16

## 2021-10-16 ENCOUNTER — ANESTHESIA (OUTPATIENT)
Dept: PERIOP | Facility: HOSPITAL | Age: 46
End: 2021-10-16

## 2021-10-16 DIAGNOSIS — E66.01 CLASS 3 SEVERE OBESITY DUE TO EXCESS CALORIES WITH SERIOUS COMORBIDITY AND BODY MASS INDEX (BMI) OF 40.0 TO 44.9 IN ADULT (HCC): ICD-10-CM

## 2021-10-16 DIAGNOSIS — K35.30 ACUTE APPENDICITIS WITH LOCALIZED PERITONITIS, WITHOUT PERFORATION, ABSCESS, OR GANGRENE: ICD-10-CM

## 2021-10-16 DIAGNOSIS — Z79.4 OTHER SPECIFIED DIABETES MELLITUS WITH OTHER SPECIFIED COMPLICATION, WITH LONG-TERM CURRENT USE OF INSULIN (HCC): ICD-10-CM

## 2021-10-16 DIAGNOSIS — Z79.4 TYPE 2 DIABETES MELLITUS WITH HYPERGLYCEMIA, WITH LONG-TERM CURRENT USE OF INSULIN (HCC): ICD-10-CM

## 2021-10-16 DIAGNOSIS — E11.65 TYPE 2 DIABETES MELLITUS WITH HYPERGLYCEMIA, WITH LONG-TERM CURRENT USE OF INSULIN (HCC): ICD-10-CM

## 2021-10-16 DIAGNOSIS — E13.69 OTHER SPECIFIED DIABETES MELLITUS WITH OTHER SPECIFIED COMPLICATION, WITH LONG-TERM CURRENT USE OF INSULIN (HCC): ICD-10-CM

## 2021-10-16 DIAGNOSIS — K35.80 ACUTE APPENDICITIS, UNSPECIFIED ACUTE APPENDICITIS TYPE: Primary | ICD-10-CM

## 2021-10-16 LAB
ALBUMIN SERPL-MCNC: 4.8 G/DL (ref 3.5–5.2)
ALBUMIN/GLOB SERPL: 1.2 G/DL
ALP SERPL-CCNC: 148 U/L (ref 39–117)
ALT SERPL W P-5'-P-CCNC: 51 U/L (ref 1–33)
ANION GAP SERPL CALCULATED.3IONS-SCNC: 14 MMOL/L (ref 5–15)
AST SERPL-CCNC: 51 U/L (ref 1–32)
BASOPHILS # BLD AUTO: 0.09 10*3/MM3 (ref 0–0.2)
BASOPHILS NFR BLD AUTO: 0.6 % (ref 0–1.5)
BILIRUB SERPL-MCNC: 0.7 MG/DL (ref 0–1.2)
BILIRUB UR QL STRIP: NEGATIVE
BUN SERPL-MCNC: 10 MG/DL (ref 6–20)
BUN/CREAT SERPL: 20.4 (ref 7–25)
CALCIUM SPEC-SCNC: 9.9 MG/DL (ref 8.6–10.5)
CHLORIDE SERPL-SCNC: 97 MMOL/L (ref 98–107)
CLARITY UR: CLEAR
CO2 SERPL-SCNC: 23 MMOL/L (ref 22–29)
COLOR UR: YELLOW
CREAT SERPL-MCNC: 0.49 MG/DL (ref 0.57–1)
DEPRECATED RDW RBC AUTO: 45.7 FL (ref 37–54)
EOSINOPHIL # BLD AUTO: 0.03 10*3/MM3 (ref 0–0.4)
EOSINOPHIL NFR BLD AUTO: 0.2 % (ref 0.3–6.2)
ERYTHROCYTE [DISTWIDTH] IN BLOOD BY AUTOMATED COUNT: 14.9 % (ref 12.3–15.4)
GFR SERPL CREATININE-BSD FRML MDRD: 138 ML/MIN/1.73
GLOBULIN UR ELPH-MCNC: 4.1 GM/DL
GLUCOSE BLDC GLUCOMTR-MCNC: 279 MG/DL (ref 70–130)
GLUCOSE BLDC GLUCOMTR-MCNC: 292 MG/DL (ref 70–130)
GLUCOSE SERPL-MCNC: 303 MG/DL (ref 65–99)
GLUCOSE UR STRIP-MCNC: ABNORMAL MG/DL
HCG SERPL QL: NEGATIVE
HCT VFR BLD AUTO: 46.7 % (ref 34–46.6)
HGB BLD-MCNC: 15.6 G/DL (ref 12–15.9)
HGB UR QL STRIP.AUTO: NEGATIVE
HOLD SPECIMEN: NORMAL
HOLD SPECIMEN: NORMAL
IMM GRANULOCYTES # BLD AUTO: 0.09 10*3/MM3 (ref 0–0.05)
IMM GRANULOCYTES NFR BLD AUTO: 0.6 % (ref 0–0.5)
KETONES UR QL STRIP: ABNORMAL
LEUKOCYTE ESTERASE UR QL STRIP.AUTO: NEGATIVE
LIPASE SERPL-CCNC: 26 U/L (ref 13–60)
LYMPHOCYTES # BLD AUTO: 2.03 10*3/MM3 (ref 0.7–3.1)
LYMPHOCYTES NFR BLD AUTO: 14.2 % (ref 19.6–45.3)
MCH RBC QN AUTO: 28.4 PG (ref 26.6–33)
MCHC RBC AUTO-ENTMCNC: 33.4 G/DL (ref 31.5–35.7)
MCV RBC AUTO: 84.9 FL (ref 79–97)
MONOCYTES # BLD AUTO: 0.73 10*3/MM3 (ref 0.1–0.9)
MONOCYTES NFR BLD AUTO: 5.1 % (ref 5–12)
NEUTROPHILS NFR BLD AUTO: 11.31 10*3/MM3 (ref 1.7–7)
NEUTROPHILS NFR BLD AUTO: 79.3 % (ref 42.7–76)
NITRITE UR QL STRIP: NEGATIVE
NRBC BLD AUTO-RTO: 0 /100 WBC (ref 0–0.2)
PH UR STRIP.AUTO: 5.5 [PH] (ref 5–9)
PLATELET # BLD AUTO: 233 10*3/MM3 (ref 140–450)
PMV BLD AUTO: 11.2 FL (ref 6–12)
POTASSIUM SERPL-SCNC: 4.2 MMOL/L (ref 3.5–5.2)
PROT SERPL-MCNC: 8.9 G/DL (ref 6–8.5)
PROT UR QL STRIP: ABNORMAL
RBC # BLD AUTO: 5.5 10*6/MM3 (ref 3.77–5.28)
SODIUM SERPL-SCNC: 134 MMOL/L (ref 136–145)
SP GR UR STRIP: 1.08 (ref 1–1.03)
UROBILINOGEN UR QL STRIP: ABNORMAL
WBC # BLD AUTO: 14.28 10*3/MM3 (ref 3.4–10.8)
WHOLE BLOOD HOLD SPECIMEN: NORMAL
WHOLE BLOOD HOLD SPECIMEN: NORMAL

## 2021-10-16 PROCEDURE — 44970 LAPAROSCOPY APPENDECTOMY: CPT | Performed by: SPECIALIST/TECHNOLOGIST, OTHER

## 2021-10-16 PROCEDURE — 25010000002 FENTANYL CITRATE (PF) 50 MCG/ML SOLUTION: Performed by: NURSE ANESTHETIST, CERTIFIED REGISTERED

## 2021-10-16 PROCEDURE — 85025 COMPLETE CBC W/AUTO DIFF WBC: CPT | Performed by: FAMILY MEDICINE

## 2021-10-16 PROCEDURE — G0378 HOSPITAL OBSERVATION PER HR: HCPCS

## 2021-10-16 PROCEDURE — 96376 TX/PRO/DX INJ SAME DRUG ADON: CPT

## 2021-10-16 PROCEDURE — 74177 CT ABD & PELVIS W/CONTRAST: CPT

## 2021-10-16 PROCEDURE — C1889 IMPLANT/INSERT DEVICE, NOC: HCPCS | Performed by: SURGERY

## 2021-10-16 PROCEDURE — 25010000002 ONDANSETRON PER 1 MG: Performed by: NURSE ANESTHETIST, CERTIFIED REGISTERED

## 2021-10-16 PROCEDURE — 25010000002 PIPERACILLIN SOD-TAZOBACTAM PER 1 G: Performed by: NURSE PRACTITIONER

## 2021-10-16 PROCEDURE — 96365 THER/PROPH/DIAG IV INF INIT: CPT

## 2021-10-16 PROCEDURE — 96375 TX/PRO/DX INJ NEW DRUG ADDON: CPT

## 2021-10-16 PROCEDURE — 25010000002 KETOROLAC TROMETHAMINE PER 15 MG: Performed by: NURSE ANESTHETIST, CERTIFIED REGISTERED

## 2021-10-16 PROCEDURE — 25010000002 NEOSTIGMINE 10 MG/10ML SOLUTION: Performed by: NURSE ANESTHETIST, CERTIFIED REGISTERED

## 2021-10-16 PROCEDURE — 80053 COMPREHEN METABOLIC PANEL: CPT | Performed by: FAMILY MEDICINE

## 2021-10-16 PROCEDURE — 25010000002 MIDAZOLAM PER 1 MG: Performed by: NURSE ANESTHETIST, CERTIFIED REGISTERED

## 2021-10-16 PROCEDURE — 63710000001 INSULIN DETEMIR PER 5 UNITS: Performed by: HOSPITALIST

## 2021-10-16 PROCEDURE — 84703 CHORIONIC GONADOTROPIN ASSAY: CPT | Performed by: FAMILY MEDICINE

## 2021-10-16 PROCEDURE — 25010000002 ENOXAPARIN PER 10 MG: Performed by: SURGERY

## 2021-10-16 PROCEDURE — 25010000002 PROPOFOL 10 MG/ML EMULSION: Performed by: NURSE ANESTHETIST, CERTIFIED REGISTERED

## 2021-10-16 PROCEDURE — 25010000002 SUCCINYLCHOLINE PER 20 MG: Performed by: NURSE ANESTHETIST, CERTIFIED REGISTERED

## 2021-10-16 PROCEDURE — 81003 URINALYSIS AUTO W/O SCOPE: CPT | Performed by: FAMILY MEDICINE

## 2021-10-16 PROCEDURE — 25010000002 HYDROMORPHONE 1 MG/ML SOLUTION: Performed by: NURSE PRACTITIONER

## 2021-10-16 PROCEDURE — 25010000002 MORPHINE PER 10 MG: Performed by: NURSE PRACTITIONER

## 2021-10-16 PROCEDURE — 25010000002 IOPAMIDOL 61 % SOLUTION: Performed by: FAMILY MEDICINE

## 2021-10-16 PROCEDURE — 83690 ASSAY OF LIPASE: CPT | Performed by: FAMILY MEDICINE

## 2021-10-16 PROCEDURE — 99284 EMERGENCY DEPT VISIT MOD MDM: CPT

## 2021-10-16 PROCEDURE — 25010000002 ONDANSETRON PER 1 MG: Performed by: NURSE PRACTITIONER

## 2021-10-16 PROCEDURE — 25010000002 HYDROMORPHONE 1 MG/ML SOLUTION: Performed by: NURSE ANESTHETIST, CERTIFIED REGISTERED

## 2021-10-16 PROCEDURE — 82962 GLUCOSE BLOOD TEST: CPT

## 2021-10-16 DEVICE — THE ECHELON, ECHELON ENDOPATH™ AND ECHELON FLEX™ FAMILIES OF ENDOSCOPIC LINEAR CUTTERS AND RELOADS ARE STERILE, SINGLE PATIENT USE INSTRUMENTS THAT SIMULTANEOUSLY CUT AND STAPLE TISSUE. THERE ARE SIX STAGGERED ROWS OF STAPLES, THREE ON EITHER SIDE OF THE CUT LINE. THE 45 MM INSTRUMENTS HAVE A STAPLE LINE THATIS APPROXIMATELY 45 MM LONG AND A CUT LINE THAT IS APPROXIMATELY 42 MM LONG. THE SHAFT CAN ROTATE FREELY IN BOTH DIRECTIONS AND AN ARTICULATION MECHANISM ON ARTICULATING INSTRUMENTS ENABLES BENDING THE DISTAL PORTIONOF THE SHAFT TO FACILITATE LATERAL ACCESS OF THE OPERATIVE SITE.THE INSTRUMENTS ARE SHIPPED WITHOUT A RELOAD AND MUST BE LOADED PRIOR TO USE. A STAPLE RETAINING CAP ON THE RELOAD PROTECTS THE STAPLE LEG POINTS DURING SHIPPING AND TRANSPORTATION. THE INSTRUMENTS’ LOCK-OUT FEATURE IS DESIGNED TO PREVENT A USED RELOAD FROM BEING REFIRED.
Type: IMPLANTABLE DEVICE | Site: ABDOMEN | Status: FUNCTIONAL
Brand: ECHELON ENDOPATH

## 2021-10-16 RX ORDER — NEOSTIGMINE METHYLSULFATE 1 MG/ML
INJECTION, SOLUTION INTRAVENOUS AS NEEDED
Status: DISCONTINUED | OUTPATIENT
Start: 2021-10-16 | End: 2021-10-16 | Stop reason: SURG

## 2021-10-16 RX ORDER — DEXTROSE MONOHYDRATE 25 G/50ML
25 INJECTION, SOLUTION INTRAVENOUS
Status: DISCONTINUED | OUTPATIENT
Start: 2021-10-16 | End: 2021-10-17 | Stop reason: HOSPADM

## 2021-10-16 RX ORDER — PROPOFOL 10 MG/ML
VIAL (ML) INTRAVENOUS AS NEEDED
Status: DISCONTINUED | OUTPATIENT
Start: 2021-10-16 | End: 2021-10-16 | Stop reason: SURG

## 2021-10-16 RX ORDER — SODIUM CHLORIDE 0.9 % (FLUSH) 0.9 %
10 SYRINGE (ML) INJECTION AS NEEDED
Status: DISCONTINUED | OUTPATIENT
Start: 2021-10-16 | End: 2021-10-17 | Stop reason: HOSPADM

## 2021-10-16 RX ORDER — NALOXONE HCL 0.4 MG/ML
0.1 VIAL (ML) INJECTION
Status: DISCONTINUED | OUTPATIENT
Start: 2021-10-16 | End: 2021-10-17 | Stop reason: HOSPADM

## 2021-10-16 RX ORDER — NALOXONE HCL 0.4 MG/ML
0.4 VIAL (ML) INJECTION
Status: DISCONTINUED | OUTPATIENT
Start: 2021-10-16 | End: 2021-10-17 | Stop reason: HOSPADM

## 2021-10-16 RX ORDER — SODIUM CHLORIDE 9 MG/ML
INJECTION, SOLUTION INTRAVENOUS CONTINUOUS PRN
Status: DISCONTINUED | OUTPATIENT
Start: 2021-10-16 | End: 2021-10-16 | Stop reason: SURG

## 2021-10-16 RX ORDER — ROCURONIUM BROMIDE 10 MG/ML
INJECTION, SOLUTION INTRAVENOUS AS NEEDED
Status: DISCONTINUED | OUTPATIENT
Start: 2021-10-16 | End: 2021-10-16 | Stop reason: SURG

## 2021-10-16 RX ORDER — HYDROCODONE BITARTRATE AND ACETAMINOPHEN 7.5; 325 MG/1; MG/1
1 TABLET ORAL EVERY 4 HOURS PRN
Status: DISCONTINUED | OUTPATIENT
Start: 2021-10-16 | End: 2021-10-17 | Stop reason: HOSPADM

## 2021-10-16 RX ORDER — SUCCINYLCHOLINE CHLORIDE 20 MG/ML
INJECTION INTRAMUSCULAR; INTRAVENOUS AS NEEDED
Status: DISCONTINUED | OUTPATIENT
Start: 2021-10-16 | End: 2021-10-16 | Stop reason: SURG

## 2021-10-16 RX ORDER — AMITRIPTYLINE HYDROCHLORIDE 50 MG/1
50 TABLET, FILM COATED ORAL DAILY
Status: DISCONTINUED | OUTPATIENT
Start: 2021-10-17 | End: 2021-10-17 | Stop reason: HOSPADM

## 2021-10-16 RX ORDER — NICOTINE POLACRILEX 4 MG
15 LOZENGE BUCCAL
Status: DISCONTINUED | OUTPATIENT
Start: 2021-10-16 | End: 2021-10-17 | Stop reason: HOSPADM

## 2021-10-16 RX ORDER — ONDANSETRON 4 MG/1
4 TABLET, FILM COATED ORAL EVERY 6 HOURS PRN
Status: DISCONTINUED | OUTPATIENT
Start: 2021-10-16 | End: 2021-10-17 | Stop reason: HOSPADM

## 2021-10-16 RX ORDER — ONDANSETRON 2 MG/ML
4 INJECTION INTRAMUSCULAR; INTRAVENOUS EVERY 6 HOURS PRN
Status: DISCONTINUED | OUTPATIENT
Start: 2021-10-16 | End: 2021-10-16 | Stop reason: SDUPTHER

## 2021-10-16 RX ORDER — LIDOCAINE HYDROCHLORIDE AND EPINEPHRINE 10; 10 MG/ML; UG/ML
INJECTION, SOLUTION INFILTRATION; PERINEURAL AS NEEDED
Status: DISCONTINUED | OUTPATIENT
Start: 2021-10-16 | End: 2021-10-16 | Stop reason: HOSPADM

## 2021-10-16 RX ORDER — SODIUM CHLORIDE, SODIUM GLUCONATE, SODIUM ACETATE, POTASSIUM CHLORIDE, AND MAGNESIUM CHLORIDE 526; 502; 368; 37; 30 MG/100ML; MG/100ML; MG/100ML; MG/100ML; MG/100ML
INJECTION, SOLUTION INTRAVENOUS CONTINUOUS PRN
Status: DISCONTINUED | OUTPATIENT
Start: 2021-10-16 | End: 2021-10-16 | Stop reason: SURG

## 2021-10-16 RX ORDER — ONDANSETRON 2 MG/ML
INJECTION INTRAMUSCULAR; INTRAVENOUS AS NEEDED
Status: DISCONTINUED | OUTPATIENT
Start: 2021-10-16 | End: 2021-10-16 | Stop reason: SURG

## 2021-10-16 RX ORDER — ONDANSETRON 2 MG/ML
4 INJECTION INTRAMUSCULAR; INTRAVENOUS ONCE
Status: COMPLETED | OUTPATIENT
Start: 2021-10-16 | End: 2021-10-16

## 2021-10-16 RX ORDER — MORPHINE SULFATE 2 MG/ML
1 INJECTION, SOLUTION INTRAMUSCULAR; INTRAVENOUS EVERY 4 HOURS PRN
Status: DISCONTINUED | OUTPATIENT
Start: 2021-10-16 | End: 2021-10-17 | Stop reason: HOSPADM

## 2021-10-16 RX ORDER — SODIUM CHLORIDE 0.9 % (FLUSH) 0.9 %
10 SYRINGE (ML) INJECTION EVERY 12 HOURS SCHEDULED
Status: DISCONTINUED | OUTPATIENT
Start: 2021-10-16 | End: 2021-10-17 | Stop reason: HOSPADM

## 2021-10-16 RX ORDER — KETOROLAC TROMETHAMINE 30 MG/ML
INJECTION, SOLUTION INTRAMUSCULAR; INTRAVENOUS AS NEEDED
Status: DISCONTINUED | OUTPATIENT
Start: 2021-10-16 | End: 2021-10-16 | Stop reason: SURG

## 2021-10-16 RX ORDER — LIDOCAINE HYDROCHLORIDE 20 MG/ML
INJECTION, SOLUTION EPIDURAL; INFILTRATION; INTRACAUDAL; PERINEURAL AS NEEDED
Status: DISCONTINUED | OUTPATIENT
Start: 2021-10-16 | End: 2021-10-16 | Stop reason: SURG

## 2021-10-16 RX ORDER — SUMATRIPTAN 50 MG/1
50 TABLET, FILM COATED ORAL ONCE AS NEEDED
Status: DISCONTINUED | OUTPATIENT
Start: 2021-10-16 | End: 2021-10-17 | Stop reason: HOSPADM

## 2021-10-16 RX ORDER — MIDAZOLAM HYDROCHLORIDE 1 MG/ML
INJECTION INTRAMUSCULAR; INTRAVENOUS AS NEEDED
Status: DISCONTINUED | OUTPATIENT
Start: 2021-10-16 | End: 2021-10-16 | Stop reason: SURG

## 2021-10-16 RX ORDER — LEVOTHYROXINE SODIUM 88 UG/1
88 TABLET ORAL DAILY
Status: DISCONTINUED | OUTPATIENT
Start: 2021-10-17 | End: 2021-10-17 | Stop reason: HOSPADM

## 2021-10-16 RX ORDER — FENTANYL CITRATE 50 UG/ML
INJECTION, SOLUTION INTRAMUSCULAR; INTRAVENOUS AS NEEDED
Status: DISCONTINUED | OUTPATIENT
Start: 2021-10-16 | End: 2021-10-16 | Stop reason: SURG

## 2021-10-16 RX ORDER — ONDANSETRON 2 MG/ML
4 INJECTION INTRAMUSCULAR; INTRAVENOUS EVERY 6 HOURS PRN
Status: DISCONTINUED | OUTPATIENT
Start: 2021-10-16 | End: 2021-10-17 | Stop reason: HOSPADM

## 2021-10-16 RX ORDER — ATORVASTATIN CALCIUM 40 MG/1
40 TABLET, FILM COATED ORAL DAILY
Status: DISCONTINUED | OUTPATIENT
Start: 2021-10-17 | End: 2021-10-17 | Stop reason: HOSPADM

## 2021-10-16 RX ORDER — BUPIVACAINE HYDROCHLORIDE 2.5 MG/ML
INJECTION, SOLUTION EPIDURAL; INFILTRATION; INTRACAUDAL AS NEEDED
Status: DISCONTINUED | OUTPATIENT
Start: 2021-10-16 | End: 2021-10-16 | Stop reason: HOSPADM

## 2021-10-16 RX ADMIN — KETOROLAC TROMETHAMINE 15 MG: 30 INJECTION, SOLUTION INTRAMUSCULAR; INTRAVENOUS at 21:25

## 2021-10-16 RX ADMIN — SODIUM CHLORIDE, SODIUM GLUCONATE, SODIUM ACETATE, POTASSIUM CHLORIDE, AND MAGNESIUM CHLORIDE: 526; 502; 368; 37; 30 INJECTION, SOLUTION INTRAVENOUS at 20:29

## 2021-10-16 RX ADMIN — MORPHINE SULFATE 4 MG: 4 INJECTION INTRAVENOUS at 16:59

## 2021-10-16 RX ADMIN — MORPHINE SULFATE 4 MG: 4 INJECTION INTRAVENOUS at 16:09

## 2021-10-16 RX ADMIN — SUCCINYLCHOLINE CHLORIDE 180 MG: 20 INJECTION, SOLUTION INTRAMUSCULAR; INTRAVENOUS at 19:43

## 2021-10-16 RX ADMIN — PROPOFOL 200 MG: 10 INJECTION, EMULSION INTRAVENOUS at 19:43

## 2021-10-16 RX ADMIN — SODIUM CHLORIDE: 9 INJECTION, SOLUTION INTRAVENOUS at 19:29

## 2021-10-16 RX ADMIN — SODIUM CHLORIDE 1000 ML: 900 INJECTION, SOLUTION INTRAVENOUS at 16:09

## 2021-10-16 RX ADMIN — INSULIN DETEMIR 25 UNITS: 100 INJECTION, SOLUTION SUBCUTANEOUS at 23:05

## 2021-10-16 RX ADMIN — ROCURONIUM BROMIDE 5 MG: 10 INJECTION INTRAVENOUS at 19:43

## 2021-10-16 RX ADMIN — NEOSTIGMINE METHYLSULFATE 4 MG: 0.5 INJECTION INTRAVENOUS at 21:13

## 2021-10-16 RX ADMIN — FENTANYL CITRATE 50 MCG: 50 INJECTION INTRAMUSCULAR; INTRAVENOUS at 19:43

## 2021-10-16 RX ADMIN — PIPERACILLIN SODIUM AND TAZOBACTAM SODIUM 4.5 G: 4; .5 INJECTION, POWDER, LYOPHILIZED, FOR SOLUTION INTRAVENOUS at 23:05

## 2021-10-16 RX ADMIN — HYDROMORPHONE HYDROCHLORIDE 1 MG: 1 INJECTION, SOLUTION INTRAMUSCULAR; INTRAVENOUS; SUBCUTANEOUS at 20:49

## 2021-10-16 RX ADMIN — ONDANSETRON 4 MG: 2 INJECTION INTRAMUSCULAR; INTRAVENOUS at 21:25

## 2021-10-16 RX ADMIN — SUGAMMADEX 200 MG: 100 INJECTION, SOLUTION INTRAVENOUS at 21:27

## 2021-10-16 RX ADMIN — LIDOCAINE HYDROCHLORIDE 100 MG: 20 INJECTION, SOLUTION EPIDURAL; INFILTRATION; INTRACAUDAL; PERINEURAL at 19:43

## 2021-10-16 RX ADMIN — ENOXAPARIN SODIUM 30 MG: 40 INJECTION SUBCUTANEOUS at 20:10

## 2021-10-16 RX ADMIN — MIDAZOLAM HYDROCHLORIDE 2 MG: 1 INJECTION, SOLUTION INTRAMUSCULAR; INTRAVENOUS at 19:38

## 2021-10-16 RX ADMIN — IOPAMIDOL 90 ML: 612 INJECTION, SOLUTION INTRAVENOUS at 16:15

## 2021-10-16 RX ADMIN — HYDROMORPHONE HYDROCHLORIDE 1 MG: 1 INJECTION, SOLUTION INTRAMUSCULAR; INTRAVENOUS; SUBCUTANEOUS at 16:58

## 2021-10-16 RX ADMIN — GLYCOPYRROLATE 0.6 MG: 0.2 INJECTION, SOLUTION INTRAMUSCULAR; INTRAVITREAL at 21:13

## 2021-10-16 RX ADMIN — ONDANSETRON HYDROCHLORIDE 4 MG: 2 INJECTION, SOLUTION INTRAMUSCULAR; INTRAVENOUS at 16:09

## 2021-10-16 NOTE — ED NOTES
Patient c/o of abdominal pain x1 day. Patient reports nausea and vomiting. Patient is constant in frequency.      Debbie Rodríguez, RN  10/16/21 [id number]

## 2021-10-16 NOTE — CONSULTS
[facility]   Consult Note    Patient Name: Diane Esquivel  : 1975  MRN: [id number]  Primary Care Physician:  Lisa Veras MD  Referring Physician: Yossi Mg MD  Date of admission: 10/16/2021    Consults  Subjective   Subjective     Reason for Consult/ Chief Complaint: Acute appendicitis    History of Present Illness  Diane Esquivel is a 45 y.o. female who presents to the emergency department today with acute onset abdominal pain.  Her pain started around midnight yesterday and was generalized in nature but quickly localized to the right lower quadrant.  There was associated nausea that developed this morning to the point that the patient could not tolerate a couple of crackers.  There is no associated fever.  Her last bowel movement was yesterday afternoon.  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  The pain was significant enough preventing her from going through her morning routine at the end of her shift.  She felt the need to get some rest but the pain was severe and ended up presenting to the emergency department for evaluation.  Here her blood work showed an elevated white blood cell count and a CT scan of the abdomen supported the diagnosis of acute appendicitis with an appendicolith. The patient's last meal was around noon today where she had a couple small crackers.      Review of Systems     On review of systems, her past surgical history is significant for a laparoscopic cholecystectomy years ago.  She has dealt with clinically severe obesity and a fatty liver for over 20 years now.  She has type 2 diabetes which is managed with multiple medications but the patient says that her sugars have been running high recently and she recalls her hemoglobin A1c to be running around 9.  There is no history of renal failure or cirrhosis although she reports being at risk for the latter in the past.   She has cycled through weight gain weight loss.  At this point, she has regained almost all the weight she is lost in the past.  The patient gives a known history of diverticulosis and says that she has had a colonoscopy years ago but the diverticulosis was diagnosed over 20 years ago around the time they were managing her fatty liver disease.      Personal History     Past Medical History:   Diagnosis Date   • Abnormal liver enzymes    • Acute serous otitis media    • Adult body mass index 30+     obesity   • Dysfunctional uterine bleeding    • Fatty liver    • Hypercholesteremia    • Hypertension associated with diabetes (HCC) 2/4/2020   • Hypothyroidism due to Hashimoto's thyroiditis 2/4/2020   • Migraine    • Migraine     tension type headache vs migraine      • Mixed diabetic hyperlipidemia associated with type 2 diabetes mellitus ([facility]) 2/4/2020   • Morbid (severe) obesity due to excess calories ([facility])     BMI 41   • Nausea, vomiting and diarrhea    • Non-alcoholic fatty liver disease    • Type 2 diabetes mellitus ([facility])     A1C 7.4   • Type 2 diabetes mellitus with hyperglycemia, with long-term current use of insulin ([facility]) 2/4/2020       Past Surgical History:   Procedure Laterality Date   • CHOLECYSTECTOMY     • INJECTION OF MEDICATION  02/18/2014    Phenergan (2)      • INJECTION OF MEDICATION  02/24/2014    Toradol (2)    • INJECTION OF MEDICATION  03/09/2014    Zofran (2)          Family History: Family history is unknown by patient. Otherwise pertinent FHx was reviewed and not pertinent to current issue.    Social History:  reports that she has never smoked. She has never used smokeless tobacco. She reports that she does not drink alcohol and does not use drugs.    Home Medications:   BASAGLAR KWIKPEN, Erenumab-aooe, Insulin Lispro (1 Unit Dial), Insulin Pen Needle, Milk Thistle, SITagliptin, SUMAtriptan, amitriptyline, atorvastatin, empagliflozin, glucose blood, levothyroxine, ondansetron ODT,  promethazine, rizatriptan MLT, and vitamin E    Allergies:  Allergies   Allergen Reactions   • Caffeine        Objective    Objective     Vitals:  Temp:  [97 °F (36.1 °C)-97.8 °F (36.6 °C)] 97.8 °F (36.6 °C)  Heart Rate:  [80-97] 80  Resp:  [20] 20  BP: (134-215)/() 215/111    Physical Exam  Constitutional:       General: She is not in acute distress.     Appearance: Normal appearance. She is obese. She is not ill-appearing, toxic-appearing or diaphoretic.   HENT:      Head: Normocephalic and atraumatic.      Nose: Nose normal.      Mouth/Throat:      Mouth: Mucous membranes are moist.   Eyes:      Extraocular Movements: Extraocular movements intact.      Pupils: Pupils are equal, round, and reactive to light.   Cardiovascular:      Rate and Rhythm: Normal rate and regular rhythm.   Pulmonary:      Effort: Pulmonary effort is normal. No respiratory distress.      Breath sounds: Normal breath sounds. No stridor. No wheezing.   Abdominal:      General: There is no distension.      Palpations: Abdomen is soft. There is no mass.      Tenderness: There is abdominal tenderness. There is no guarding or rebound.      Hernia: No hernia is present.      Comments: There is notable abdominal obesity.  Scars from previous laparoscopic cholecystectomy were noted.  The patient is mostly tender to light and deep palpation on the right side.  Although there is no rebound or guarding there is definite peritoneal exam on palpation of the right lower quadrant.   Musculoskeletal:         General: No swelling or tenderness. Normal range of motion.      Cervical back: Normal range of motion and neck supple.   Skin:     General: Skin is warm and dry.      Findings: No erythema or rash.   Neurological:      General: No focal deficit present.      Mental Status: She is alert and oriented to person, place, and time.      Cranial Nerves: No cranial nerve deficit.   Psychiatric:         Mood and Affect: Mood normal.         Behavior:  Behavior normal.         Judgment: Judgment normal.         Result Review    Result Review:  I have personally reviewed the results from the time of this admission to 10/16/2021 18:39 CDT and agree with these findings:  [x]  Laboratory  []  Microbiology  [x]  Radiology  []  EKG/Telemetry   []  Cardiology/Vascular   []  Pathology  []  Old records  []  Other:  Most notable findings include: An elevated white blood cell count of 14.28.  There is a neutrophilia with immature granulocytes detected.  Her hemoglobin is normal at 15.6.  Her platelet count is also normal at 233.  Her blood chemistry is significant for an elevated glucose level of 303.  Her liver enzymes are also slightly elevated.    Assessment/Plan   Assessment / Plan     Brief Patient Summary:  Diane Esquivel is a 45 y.o. female who presents with acute onset generalized but quickly localizing right lower quadrant pain.  Her presentation is typical for acute appendicitis and this is supported by her blood work profile as well as the results of the CT imaging.  She also suffers from clinically severe obesity with a BMI of 40 kg/m² and has multiple associated metabolic comorbidities, not the least of which to mention being poorly controlled type 2 diabetes mellitus.  She also has dyslipidemia and what sounds like hepatic steatosis.  It is difficult to interpret the mildly elevated liver enzymes at this point in the context of the current presentation but there might be an element of chronic liver disease related to her obesity.  I understand she is seeing an endocrinologist as an outpatient.  Given her overall health status, the low likelihood of her appendicitis resolving medically, and the high risks potentially involved with delayed surgical intervention, and appendectomy is advisable.  The patient is at higher than normal risk for this operation.  She will benefit from admission to hospital under the hospitalist service and perioperative medical  management, starting with better glycemic control preoperatively as we prepare her for an appendectomy.  Part of the glycemic response is consistent with her current inflammatory state and acute appendicitis.  On review of CT imaging, it appears that the appendix is intact and has not perforated.  The risks benefits and alternatives to a laparoscopic cholecystectomy were discussed in detail, including the risks death, blood transfusions, and major complications potentially requiring follow-up surgical or radiologic interventions.  The consent was conducted in the presence of her .  The patient wishes to proceed with surgery for definitive management of her acute appendicitis.  She has already been started on IV antibiotics and we will continue this and reviewed duration of therapy postoperatively.  She is at high risk of perioperative VTE and prophylaxis will be offered in the form of both SCDs and subcutaneous Lovenox.    Active Hospital Problems:  Active Hospital Problems    Diagnosis    • Acute appendicitis      Plan:   Proceed with laparoscopic appendectomy, possible open.    Electronically signed by Prudencio Petty MD, 10/16/21, 6:39 PM CDT.

## 2021-10-16 NOTE — ED PROVIDER NOTES
Subjective   45-year-old female in the emergency department complaining of right lower quadrant pain.  Patient reports onset of complaint started at midnight.  Reports that she has had minimal food today but endorses nausea and vomiting with it.       History provided by:  Patient   used: No        Review of Systems   Constitutional: Positive for activity change, appetite change and fatigue.   HENT: Negative for congestion.    Respiratory: Negative for shortness of breath.    Cardiovascular: Negative for chest pain and palpitations.   Gastrointestinal: Positive for abdominal pain, nausea and vomiting. Negative for constipation and diarrhea.   Genitourinary: Negative for flank pain.   Musculoskeletal: Negative for gait problem.   Skin: Negative for wound.   Allergic/Immunologic: Negative for immunocompromised state.   Neurological: Positive for weakness.   Hematological: Negative for adenopathy.   Psychiatric/Behavioral: Negative for confusion.   All other systems reviewed and are negative.      Past Medical History:   Diagnosis Date   • Abnormal liver enzymes    • Acute serous otitis media    • Adult body mass index 30+     obesity   • Dysfunctional uterine bleeding    • Fatty liver    • Hypercholesteremia    • Hypertension associated with diabetes ([facility]) 2/4/2020   • Hypothyroidism due to Hashimoto's thyroiditis 2/4/2020   • Migraine    • Migraine     tension type headache vs migraine      • Mixed diabetic hyperlipidemia associated with type 2 diabetes mellitus ([facility]) 2/4/2020   • Morbid (severe) obesity due to excess calories ([facility])     BMI 41   • Nausea, vomiting and diarrhea    • Non-alcoholic fatty liver disease    • Type 2 diabetes mellitus ([facility])     A1C 7.4   • Type 2 diabetes mellitus with hyperglycemia, with long-term current use of insulin ([facility]) 2/4/2020       Allergies   Allergen Reactions   • Caffeine        Past Surgical History:   Procedure Laterality Date   • CHOLECYSTECTOMY     •  INJECTION OF MEDICATION  02/18/2014    Phenergan (2)      • INJECTION OF MEDICATION  02/24/2014    Toradol (2)    • INJECTION OF MEDICATION  03/09/2014    Zofran (2)          Family History   Family history unknown: Yes       Social History     Socioeconomic History   • Marital status:    Tobacco Use   • Smoking status: Never Smoker   • Smokeless tobacco: Never Used   Substance and Sexual Activity   • Alcohol use: No   • Drug use: No   • Sexual activity: Defer           Objective   Physical Exam  Vitals and nursing note reviewed.   Constitutional:       Appearance: She is well-developed.   HENT:      Head: Normocephalic.      Nose: Nose normal.   Eyes:      Conjunctiva/sclera: Conjunctivae normal.      Pupils: Pupils are equal, round, and reactive to light.   Cardiovascular:      Rate and Rhythm: Normal rate and regular rhythm.      Heart sounds: Normal heart sounds.   Pulmonary:      Effort: Pulmonary effort is normal.      Breath sounds: Normal breath sounds.   Abdominal:      Palpations: Abdomen is soft.      Tenderness: There is abdominal tenderness in the right lower quadrant. Positive signs include psoas sign.   Musculoskeletal:         General: Normal range of motion.      Cervical back: Normal range of motion.   Skin:     General: Skin is warm and dry.   Neurological:      Mental Status: She is alert and oriented to person, place, and time.      GCS: GCS eye subscore is 4. GCS verbal subscore is 5. GCS motor subscore is 6.         Procedures           ED Course  ED Course as of 10/16/21 1835   Sat Oct 16, 2021   1703 Treated with IV fluids, Zosyn, Gen Surgeon on call.  [JL]      ED Course User Index  [JL] Tom Bae, YESICA      CT Abdomen Pelvis With Contrast   Final Result   Addendum 1 of 1    ADDENDUM    ADDENDUM #1          Report confirmed with nurse practitioner Tom Bae 5:02 PM   CST.      Electronically signed by:  Ean De Paz MD  10/16/2021 5:05 PM   CDT Workstation: [employer]          Final   CONCLUSION:   Fluid-filled dilated appendix with appendicoliths and minimal   inflammatory changes in the adjacent fat, compatible with acute   appendicitis.   No perforation or abscess.   Fatty infiltration of the liver.   Cholecystectomy.   Prominent left-sided periuterine veins which may represent pelvic   congestive syndrome.      [id number]      Electronically signed by:  Ean De Paz MD  10/16/2021 4:42 PM   CDT Workstation: [employer]        Results for orders placed or performed during the hospital encounter of 10/16/21   Comprehensive Metabolic Panel    Specimen: Blood   Result Value Ref Range    Glucose 303 (H) 65 - 99 mg/dL    BUN 10 6 - 20 mg/dL    Creatinine 0.49 (L) 0.57 - 1.00 mg/dL    Sodium 134 (L) 136 - 145 mmol/L    Potassium 4.2 3.5 - 5.2 mmol/L    Chloride 97 (L) 98 - 107 mmol/L    CO2 23.0 22.0 - 29.0 mmol/L    Calcium 9.9 8.6 - 10.5 mg/dL    Total Protein 8.9 (H) 6.0 - 8.5 g/dL    Albumin 4.80 3.50 - 5.20 g/dL    ALT (SGPT) 51 (H) 1 - 33 U/L    AST (SGOT) 51 (H) 1 - 32 U/L    Alkaline Phosphatase 148 (H) 39 - 117 U/L    Total Bilirubin 0.7 0.0 - 1.2 mg/dL    eGFR Non African Amer 138 >60 mL/min/1.73    Globulin 4.1 gm/dL    A/G Ratio 1.2 g/dL    BUN/Creatinine Ratio 20.4 7.0 - 25.0    Anion Gap 14.0 5.0 - 15.0 mmol/L   Lipase    Specimen: Blood   Result Value Ref Range    Lipase 26 13 - 60 U/L   hCG, Serum, Qualitative    Specimen: Blood   Result Value Ref Range    HCG Qualitative Negative Negative   CBC Auto Differential    Specimen: Blood   Result Value Ref Range    WBC 14.28 (H) 3.40 - 10.80 10*3/mm3    RBC 5.50 (H) 3.77 - 5.28 10*6/mm3    Hemoglobin 15.6 12.0 - 15.9 g/dL    Hematocrit 46.7 (H) 34.0 - 46.6 %    MCV 84.9 79.0 - 97.0 fL    MCH 28.4 26.6 - 33.0 pg    MCHC 33.4 31.5 - 35.7 g/dL    RDW 14.9 12.3 - 15.4 %    RDW-SD 45.7 37.0 - 54.0 fl    MPV 11.2 6.0 - 12.0 fL    Platelets 233 140 - 450 10*3/mm3    Neutrophil % 79.3 (H) 42.7 - 76.0 %    Lymphocyte % 14.2 (L) 19.6  - 45.3 %    Monocyte % 5.1 5.0 - 12.0 %    Eosinophil % 0.2 (L) 0.3 - 6.2 %    Basophil % 0.6 0.0 - 1.5 %    Immature Grans % 0.6 (H) 0.0 - 0.5 %    Neutrophils, Absolute 11.31 (H) 1.70 - 7.00 10*3/mm3    Lymphocytes, Absolute 2.03 0.70 - 3.10 10*3/mm3    Monocytes, Absolute 0.73 0.10 - 0.90 10*3/mm3    Eosinophils, Absolute 0.03 0.00 - 0.40 10*3/mm3    Basophils, Absolute 0.09 0.00 - 0.20 10*3/mm3    Immature Grans, Absolute 0.09 (H) 0.00 - 0.05 10*3/mm3    nRBC 0.0 0.0 - 0.2 /100 WBC   Urinalysis With Microscopic If Indicated (No Culture) - Urine, Clean Catch    Specimen: Urine, Clean Catch   Result Value Ref Range    Color, UA Yellow Yellow, Straw, Dark Yellow, Marilee    Appearance, UA Clear Clear    pH, UA 5.5 5.0 - 9.0    Specific Gravity, UA 1.078 (H) 1.003 - 1.030    Glucose, UA >=1000 mg/dL (3+) (A) Negative    Ketones, UA 80 mg/dL (3+) (A) Negative    Bilirubin, UA Negative Negative    Blood, UA Negative Negative    Protein, UA Trace (A) Negative    Leuk Esterase, UA Negative Negative    Nitrite, UA Negative Negative    Urobilinogen, UA 0.2 E.U./dL 0.2 - 1.0 E.U./dL   Green Top (Gel)   Result Value Ref Range    Extra Tube Hold for add-ons.    Lavender Top   Result Value Ref Range    Extra Tube hold for add-on    Light Blue Top   Result Value Ref Range    Extra Tube hold for add-on                                           MDM  Number of Diagnoses or Management Options  Acute appendicitis, unspecified acute appendicitis type: new and requires workup  Other specified diabetes mellitus with other specified complication, with long-term current use of insulin (HCC): new and requires workup  Diagnosis management comments: Contacted Hospitalist and agreed upon admission. Orders placed. Patient informed          Amount and/or Complexity of Data Reviewed  Clinical lab tests: reviewed and ordered  Tests in the radiology section of CPT®: ordered and reviewed    Risk of Complications, Morbidity, and/or  Mortality  Presenting problems: moderate  Diagnostic procedures: moderate  Management options: moderate    Patient Progress  Patient progress: stable      Final diagnoses:   Acute appendicitis, unspecified acute appendicitis type   Other specified diabetes mellitus with other specified complication, with long-term current use of insulin (HCC)       ED Disposition  ED Disposition     ED Disposition Condition Comment    Decision to Admit  Level of Care: Med/Surg [1]   Diagnosis: Acute appendicitis, unspecified acute appendicitis type [[id number]]   Admitting Physician: GIO QUINTERO [[id number]]   Attending Physician: GIO QUINTERO [[id number]]            No follow-up provider specified.       Medication List      No changes were made to your prescriptions during this visit.          Tom Bae, APRN  10/16/21 [id number]

## 2021-10-17 VITALS
WEIGHT: 250 LBS | TEMPERATURE: 96.9 F | HEIGHT: 66 IN | HEART RATE: 88 BPM | DIASTOLIC BLOOD PRESSURE: 90 MMHG | OXYGEN SATURATION: 96 % | SYSTOLIC BLOOD PRESSURE: 152 MMHG | RESPIRATION RATE: 18 BRPM | BODY MASS INDEX: 40.18 KG/M2

## 2021-10-17 LAB
ANION GAP SERPL CALCULATED.3IONS-SCNC: 11 MMOL/L (ref 5–15)
BASOPHILS # BLD AUTO: 0.06 10*3/MM3 (ref 0–0.2)
BASOPHILS NFR BLD AUTO: 0.5 % (ref 0–1.5)
BUN SERPL-MCNC: 9 MG/DL (ref 6–20)
BUN/CREAT SERPL: 18.4 (ref 7–25)
CALCIUM SPEC-SCNC: 8.4 MG/DL (ref 8.6–10.5)
CHLORIDE SERPL-SCNC: 99 MMOL/L (ref 98–107)
CO2 SERPL-SCNC: 24 MMOL/L (ref 22–29)
CREAT SERPL-MCNC: 0.49 MG/DL (ref 0.57–1)
DEPRECATED RDW RBC AUTO: 48.4 FL (ref 37–54)
EOSINOPHIL # BLD AUTO: 0.01 10*3/MM3 (ref 0–0.4)
EOSINOPHIL NFR BLD AUTO: 0.1 % (ref 0.3–6.2)
ERYTHROCYTE [DISTWIDTH] IN BLOOD BY AUTOMATED COUNT: 15.2 % (ref 12.3–15.4)
GFR SERPL CREATININE-BSD FRML MDRD: 138 ML/MIN/1.73
GLUCOSE BLDC GLUCOMTR-MCNC: 248 MG/DL (ref 70–130)
GLUCOSE BLDC GLUCOMTR-MCNC: 278 MG/DL (ref 70–130)
GLUCOSE SERPL-MCNC: 273 MG/DL (ref 65–99)
HCT VFR BLD AUTO: 38.1 % (ref 34–46.6)
HGB BLD-MCNC: 12.5 G/DL (ref 12–15.9)
IMM GRANULOCYTES # BLD AUTO: 0.05 10*3/MM3 (ref 0–0.05)
IMM GRANULOCYTES NFR BLD AUTO: 0.4 % (ref 0–0.5)
LYMPHOCYTES # BLD AUTO: 2.1 10*3/MM3 (ref 0.7–3.1)
LYMPHOCYTES NFR BLD AUTO: 18.7 % (ref 19.6–45.3)
MCH RBC QN AUTO: 28.4 PG (ref 26.6–33)
MCHC RBC AUTO-ENTMCNC: 32.8 G/DL (ref 31.5–35.7)
MCV RBC AUTO: 86.6 FL (ref 79–97)
MONOCYTES # BLD AUTO: 0.65 10*3/MM3 (ref 0.1–0.9)
MONOCYTES NFR BLD AUTO: 5.8 % (ref 5–12)
NEUTROPHILS NFR BLD AUTO: 74.5 % (ref 42.7–76)
NEUTROPHILS NFR BLD AUTO: 8.37 10*3/MM3 (ref 1.7–7)
NRBC BLD AUTO-RTO: 0 /100 WBC (ref 0–0.2)
PLATELET # BLD AUTO: 201 10*3/MM3 (ref 140–450)
PMV BLD AUTO: 11.3 FL (ref 6–12)
POTASSIUM SERPL-SCNC: 4.1 MMOL/L (ref 3.5–5.2)
RBC # BLD AUTO: 4.4 10*6/MM3 (ref 3.77–5.28)
SODIUM SERPL-SCNC: 134 MMOL/L (ref 136–145)
WBC # BLD AUTO: 11.24 10*3/MM3 (ref 3.4–10.8)

## 2021-10-17 PROCEDURE — 25010000002 PIPERACILLIN SOD-TAZOBACTAM PER 1 G: Performed by: HOSPITALIST

## 2021-10-17 PROCEDURE — 36415 COLL VENOUS BLD VENIPUNCTURE: CPT | Performed by: HOSPITALIST

## 2021-10-17 PROCEDURE — 25010000002 PIPERACILLIN SOD-TAZOBACTAM PER 1 G: Performed by: SURGERY

## 2021-10-17 PROCEDURE — 80048 BASIC METABOLIC PNL TOTAL CA: CPT | Performed by: HOSPITALIST

## 2021-10-17 PROCEDURE — 85025 COMPLETE CBC W/AUTO DIFF WBC: CPT | Performed by: HOSPITALIST

## 2021-10-17 PROCEDURE — G0378 HOSPITAL OBSERVATION PER HR: HCPCS

## 2021-10-17 PROCEDURE — 63710000001 INSULIN ASPART PER 5 UNITS: Performed by: HOSPITALIST

## 2021-10-17 PROCEDURE — 82962 GLUCOSE BLOOD TEST: CPT

## 2021-10-17 RX ORDER — HYDROCODONE BITARTRATE AND ACETAMINOPHEN 7.5; 325 MG/1; MG/1
1 TABLET ORAL EVERY 4 HOURS PRN
Qty: 24 TABLET | Refills: 0 | Status: SHIPPED | OUTPATIENT
Start: 2021-10-17 | End: 2021-10-23

## 2021-10-17 RX ADMIN — PIPERACILLIN SODIUM AND TAZOBACTAM SODIUM 4.5 G: 4; .5 INJECTION, POWDER, LYOPHILIZED, FOR SOLUTION INTRAVENOUS at 06:15

## 2021-10-17 RX ADMIN — SODIUM CHLORIDE, PRESERVATIVE FREE 10 ML: 5 INJECTION INTRAVENOUS at 11:27

## 2021-10-17 RX ADMIN — LEVOTHYROXINE SODIUM 88 MCG: 88 TABLET ORAL at 08:00

## 2021-10-17 RX ADMIN — AMITRIPTYLINE HYDROCHLORIDE 50 MG: 50 TABLET, FILM COATED ORAL at 08:00

## 2021-10-17 RX ADMIN — INSULIN ASPART 8 UNITS: 100 INJECTION, SOLUTION INTRAVENOUS; SUBCUTANEOUS at 11:28

## 2021-10-17 RX ADMIN — HYDROCODONE BITARTRATE AND ACETAMINOPHEN 1 TABLET: 7.5; 325 TABLET ORAL at 11:28

## 2021-10-17 RX ADMIN — ATORVASTATIN CALCIUM 40 MG: 40 TABLET, FILM COATED ORAL at 08:00

## 2021-10-17 RX ADMIN — INSULIN ASPART 12 UNITS: 100 INJECTION, SOLUTION INTRAVENOUS; SUBCUTANEOUS at 07:57

## 2021-10-17 NOTE — OP NOTE
APPENDECTOMY LAPAROSCOPIC POSSIBLE OPEN  Procedure Note    Diane Esquivel  10/16/2021    Pre-op Diagnosis:   Acute appendicitis, unspecified acute appendicitis type [K35.80]  Type 2 diabetes mellitus with hyperglycemia, with long-term current use of insulin ([facility]) [E11.65, Z79.4]  Class 3 severe obesity due to excess calories with serious comorbidity and body mass index (BMI) of 40.0 to 44.9 in adult ([facility]) [E66.01, Z68.41]    Post-op Diagnosis:     Post-Op Diagnosis Codes:     * Acute appendicitis with localized peritonitis without perforation [K35.30]     * Type 2 diabetes mellitus with hyperglycemia, with long-term current use of insulin ([facility]) [E11.65, Z79.4]     * Class 3 severe obesity due to excess calories with serious comorbidity and body mass index (BMI) of 40.0 to 44.9 in adult ([facility]) [E66.01, Z68.41]    Procedure/CPT® Codes:  ASC LAPAROSCOPIC APPENDECTOMY [39764]       Procedure(s):  APPENDECTOMY LAPAROSCOPIC    Surgeon(s):  Prudencio Petty MD    Anesthesia:   CRNA: Orestes Graham CRNA   General    Staff:   Circulator: Chelly Veras RN  Scrub Person: Sharon De Paz  Assistant: Kika Sandhu CSA    Assistant: Kika Sandhu CSA was responsible for performing the following activities: Retraction, Suturing, Closing, Placing Dressing and Held/Positioned Camera and their skilled assistance was necessary for the success of this case.     Estimated Blood Loss: minimal    Specimens:                ID Type Source Tests Collected by Time   A (Not marked as sent) :  Tissue Large Intestine, Appendix TISSUE PATHOLOGY EXAM Prudencio Petty MD 10/16/2021 2039         Drains: * No LDAs found *    Indications: Acute appendicitis    Findings: Acute suppurative nonperforating appendicitis with localized peritonitis    Complications: None apparent    Procedure: In the operating room, the patient was appropriately positioned anesthetized prepped and draped.  The operation was performed under general anesthetic  with endotracheal intubation.  A timeout was performed.  The patient was given prophylactic antibiotics and anticoagulants.  SCDs were applied and active.  Access to the abdominal cavity was gained using a 5 mm 0 degree scope loaded onto a 5 mm port applied in the periumbilical position inferiorly.  A carbon dioxide pneumoperitoneum was raised.  Under direct vision a 5 mm port was placed suprapubically.  Purulent fluid was noted in the right lower quadrant only around the terminal ileum and right paracolic gutter.  A suction  tip was introduced to aspirate this fluid.  The patient was then placed in a headdown Trendelenburg position and tilted towards the surgeon on the patient's left side.  With minimal retraction using the laparoscopic instruments the appendix came into view and was obviously inflamed with an exudate covering the serosal surface.  The laparoscope was pan towards the liver and the patient was confirmed to have hepatic steatosis.  There was no purulent fluid or exudate in the right upper quadrant.  An 11 mm port was placed in the left lower quadrant slightly below the transumbilical line.  We focused our attention on the appendix.  It was inflamed and distended along three quarters of its length.  The base of the appendix appeared unremarkable.  The appendix was looped on itself and adherent with its thickened mesoappendix on the undersurface of the mesentery of the terminal ileum.  The ileal sail was identified and grasped to expose the appendiceal base.  Careful dissection with the use of the hook electrocautery was performed to separate the appendix and clear it at its base.  Ralston dissection was also used with the irrigation catheter.  A window was created under the base of the appendix.  No significant dissection was required of the cecum off of the lateral wall.  The base of the appendix was then divided with an [employer] flex stapler and a blue 45 mm cartridge.  The 11 mm port in the  left lower quadrant was exchanged for a 12 mm port to allow use of the stapler.  Once the appendix was divided at its base the mesoappendix was then divided with the use of a white 45 mm staple load.  There was good hemostasis.  The area was irrigated copiously.  A Ray-Marium sponge was introduced into the abdomen and placed at the site of dissection.  The appendix itself was placed in an Endo Catch retrieval bag and delivered through the 12 mm port site which required some dilation at the fascia with the surgeon's finger.  The appendix was delivered whole inside the bag and passed off as a specimen to pathology.  The 12 mm port was repositioned.  The sponge was inspected and was white for the most part.  It was exteriorized.  The dissection field was irrigated with saline with clear returns.  There was good hemostasis.  The Loco Cee closure button was used to close the fascia at the 12 mm site in the left lower quadrant.  This was done using a heavy 0 Vicryl stitch.  The port sites were removed under vision and the abdomen was deflated.  Local anesthetic was infiltrated into the wound sites.  The skin was closed with 4-0 Vicryl.  Steri-Strips were applied.  The patient tolerated the procedure well.  There were no apparent complications.  She was transported to the postanesthetic recovery unit in good and stable condition.  I communicated the operative findings and surgical course with the  who was in the waiting area after the surgery was completed.    Disposition: The patient is expected to do well from a surgical standpoint and may be ready for discharge within 12 to 24 hours.  Her medical management may require longer time, however, to optimize her glycemic control for example.  Her sugars are expected to improve now that she has underwent an appendectomy.  Nevertheless based on presenting history it seems that her outpatient glycemic control has been less than optimal.  She is admitted to the  [facility] under the service of the hospitalist team, whose input and medical care is appreciated.          This document has been electronically signed by Prudencio Petty MD on October 16, 2021 22:05 CDT

## 2021-10-17 NOTE — H&P
[facility] Medicine Admission      Date of Admission: 10/16/2021      Primary Care Physician: Lisa Veras MD      Chief Complaint: Abdominal pain    HPI: Patient states that at approximately midnight on the day of admission she developed significant abdominal pain.  Pain was right lower quadrant and location.  Patient was at work when this started.  She states the pain was unrelenting and worsened throughout the remainder of her shift.  She went home and pain did not get any better so she presented to the emergency department.  No real alleviating factors.  She did have significant nausea and vomiting.  She denies fever.    Concurrent Medical History:  has a past medical history of Abnormal liver enzymes, Acute serous otitis media, Adult body mass index 30+, Dysfunctional uterine bleeding, Fatty liver, Hypercholesteremia, Hypertension associated with diabetes ([facility]) (2/4/2020), Hypothyroidism due to Hashimoto's thyroiditis (2/4/2020), Migraine, Migraine, Mixed diabetic hyperlipidemia associated with type 2 diabetes mellitus ([facility]) (2/4/2020), Morbid (severe) obesity due to excess calories ([facility]), Nausea, vomiting and diarrhea, Non-alcoholic fatty liver disease, Type 2 diabetes mellitus ([facility]), and Type 2 diabetes mellitus with hyperglycemia, with long-term current use of insulin ([facility]) (2/4/2020).    Past Surgical History:  has a past surgical history that includes Cholecystectomy; Injection of Medication (02/18/2014); Injection of Medication (02/24/2014); and Injection of Medication (03/09/2014).    Family History: Diabetes, strokes    Social History:  reports that she has never smoked. She has never used smokeless tobacco. She reports that she does not drink alcohol and does not use drugs.    Allergies:   Allergies   Allergen Reactions   • Caffeine        Medications:   Prior to Admission medications    Medication Sig Start Date End Date Taking? Authorizing Provider    amitriptyline (ELAVIL) 50 MG tablet Take 50 mg by mouth Daily. 3/6/20   Paulette Lopez MD   atorvastatin (LIPITOR) 40 MG tablet Take 40 mg by mouth Daily.    ProviderPaulette MD   Empagliflozin (Jardiance) 25 MG tablet Take 25 mg by mouth Daily. One tablet daily before breakfast 5/11/21   Chucky De Paz APRN   Erenumab-aooe (Aimovig) 70 MG/ML prefilled syringe Inject 70 mg under the skin into the appropriate area as directed Every 30 (Thirty) Days. 2/9/21   Emergency, Nurse aDmien RN   glucose blood test strip Test 3 times daily, E11.9 5/11/21   Chucky De Paz APRN   Insulin Glargine (BASAGLAR KWIKPEN) 100 UNIT/ML injection pen 30  units qhs 3/4/21   Chucky De Paz APRN   Insulin Lispro, 1 Unit Dial, (HumaLOG KwikPen) 100 UNIT/ML solution pen-injector 6 up to 25 units with each meal TID 8/9/21   Chucky De Paz APRN   Insulin Pen Needle (Advocate Insulin Pen Needles) 31G X 8 MM misc Inject 4 times daily 8/11/21   Chucky De Paz APRN   levothyroxine (Synthroid) 88 MCG tablet Take 1 tablet by mouth Daily. 10/28/20 10/28/21  Chucky De Paz APRN   Milk Thistle 150 MG capsule Take 1 capsule by mouth Daily.    ProviderPaulette MD   ondansetron ODT (ZOFRAN-ODT) 4 MG disintegrating tablet Take 1 tablet by mouth Every 8 (Eight) Hours As Needed for Nausea or Vomiting for up to 8 doses. 8/18/18   Yahaira Rosas APRN   ONE TOUCH ULTRA TEST test strip  8/26/18   Emergency, Nurse Damien RN   promethazine (PHENERGAN) 25 MG tablet Take one tablet for nausea with migraine may repeat in six hours. Max 2 tabs in 24 hours. 2/9/21   Emergency, Nurse Damien RN   rizatriptan MLT (MAXALT-MLT) 10 MG disintegrating tablet Place 10 mg on the tongue 1 (One) Time As Needed. 2/9/21   Emergency, Nurse Epic, RN   SITagliptin (Januvia) 100 MG tablet 100 mg by mouth  daily before breakfast 5/11/21   Chucky De Paz APRN   SUMAtriptan (IMITREX) 50 MG tablet Take 50 mg by  mouth Daily. 3/6/20   ProviderPaulette MD   vitamin E 400 UNIT capsule Take 400 Units by mouth Daily.    Paulette Lopez MD       Review of Systems:  Review of Systems   Constitutional: Positive for activity change, appetite change and fatigue. Negative for chills, fever and unexpected weight change.   HENT: Negative for congestion, facial swelling, hearing loss, nosebleeds, rhinorrhea, sneezing, trouble swallowing and voice change.    Eyes: Negative for photophobia and visual disturbance.   Respiratory: Negative for apnea, cough, choking, chest tightness, shortness of breath, wheezing and stridor.    Cardiovascular: Negative for chest pain, palpitations and leg swelling.   Gastrointestinal: Positive for abdominal pain, nausea and vomiting. Negative for blood in stool, constipation and diarrhea.   Endocrine: Negative for cold intolerance, heat intolerance, polydipsia, polyphagia and polyuria.        Poorly controlled diabetes mellitus   Genitourinary: Negative for dysuria, flank pain and hematuria.   Musculoskeletal: Negative for arthralgias, back pain, myalgias and neck pain.   Skin: Negative for rash and wound.   Allergic/Immunologic: Negative for immunocompromised state.   Neurological: Negative for dizziness, seizures, syncope, speech difficulty, weakness, light-headedness, numbness and headaches.   Hematological: Does not bruise/bleed easily.   Psychiatric/Behavioral: Negative for agitation, behavioral problems, confusion, decreased concentration, hallucinations, self-injury and suicidal ideas. The patient is not nervous/anxious.       Otherwise complete ROS is negative except as mentioned above.    Physical Exam:   Temp:  [97 °F (36.1 °C)-97.8 °F (36.6 °C)] 97.8 °F (36.6 °C)  Heart Rate:  [80-97] 80  Resp:  [20] 20  BP: (134-215)/() 215/111  Physical Exam  Constitutional:       Appearance: She is well-developed. She is morbidly obese.      Comments: BMI 40.03   HENT:      Head: Normocephalic  and atraumatic.      Nose: Nose normal.   Eyes:      General: Lids are normal. No scleral icterus.     Conjunctiva/sclera: Conjunctivae normal.      Pupils: Pupils are equal, round, and reactive to light.   Neck:      Vascular: No JVD.      Trachea: No tracheal tenderness or tracheal deviation.   Cardiovascular:      Rate and Rhythm: Normal rate and regular rhythm.      Pulses: Normal pulses.      Heart sounds: Normal heart sounds, S1 normal and S2 normal. No murmur heard.  No friction rub. No gallop.    Pulmonary:      Effort: Pulmonary effort is normal. No accessory muscle usage or respiratory distress.      Breath sounds: Normal breath sounds. No decreased breath sounds, wheezing or rales.   Chest:      Chest wall: No tenderness.   Abdominal:      General: Bowel sounds are normal. There is no distension.      Palpations: Abdomen is soft. There is no mass.      Tenderness: There is abdominal tenderness in the right lower quadrant. There is no guarding or rebound.   Musculoskeletal:         General: No tenderness.      Right shoulder: No tenderness. Normal range of motion.      Cervical back: Normal range of motion and neck supple. No edema or rigidity. No spinous process tenderness. Normal range of motion.   Skin:     General: Skin is warm.      Coloration: Skin is not pale.      Findings: No rash.   Neurological:      Mental Status: She is alert and oriented to person, place, and time.      Cranial Nerves: No cranial nerve deficit.      Sensory: No sensory deficit.      Motor: No atrophy, abnormal muscle tone or seizure activity.      Coordination: Coordination normal.      Deep Tendon Reflexes: Reflexes are normal and symmetric. Reflexes normal.   Psychiatric:         Behavior: Behavior normal.         Thought Content: Thought content normal.         Judgment: Judgment normal.           Results Reviewed:  I have personally reviewed current lab, radiology, and data and agree with results.  Lab Results (last 24  hours)     Procedure Component Value Units Date/Time    [place] Draw [[id number]] Collected: 10/16/21 1524    Specimen: Blood Updated: 10/16/21 1630    Narrative:      The following orders were created for panel order [place] Draw.  Procedure                               Abnormality         Status                     ---------                               -----------         ------                     Green Top (Gel)[[id number]]                                  Final result               Lavender Top[[id number]]                                     Final result               Gold Top - SST[[id number]]                                                              Light Blue Top[[id number]]                                   Final result                 Please view results for these tests on the individual orders.    Green Top (Gel) [[id number]] Collected: 10/16/21 1524    Specimen: Blood Updated: 10/16/21 1630     Extra Tube Hold for add-ons.     Comment: Auto resulted.       Lavender Top [[id number]] Collected: 10/16/21 1524    Specimen: Blood Updated: 10/16/21 1630     Extra Tube hold for add-on     Comment: Auto resulted       Light Blue Top [[id number]] Collected: 10/16/21 1524    Specimen: Blood Updated: 10/16/21 1630     Extra Tube hold for add-on     Comment: Auto resulted       Urinalysis With Microscopic If Indicated (No Culture) - Urine, Clean Catch [[id number]]  (Abnormal) Collected: 10/16/21 1546    Specimen: Urine, Clean Catch Updated: 10/16/21 1559     Color, UA Yellow     Appearance, UA Clear     pH, UA 5.5     Specific Gravity, UA 1.078     Comment: Result obtained by Refractometer        Glucose, UA >=1000 mg/dL (3+)     Ketones, UA 80 mg/dL (3+)     Bilirubin, UA Negative     Blood, UA Negative     Protein, UA Trace     Leuk Esterase, UA Negative     Nitrite, UA Negative     Urobilinogen, UA 0.2 E.U./dL    Narrative:      Urine microscopic not indicated.    Comprehensive Metabolic Panel [[id number]]   (Abnormal) Collected: 10/16/21 1524    Specimen: Blood Updated: 10/16/21 1556     Glucose 303 mg/dL      BUN 10 mg/dL      Creatinine 0.49 mg/dL      Sodium 134 mmol/L      Potassium 4.2 mmol/L      Chloride 97 mmol/L      CO2 23.0 mmol/L      Calcium 9.9 mg/dL      Total Protein 8.9 g/dL      Albumin 4.80 g/dL      ALT (SGPT) 51 U/L      AST (SGOT) 51 U/L      Alkaline Phosphatase 148 U/L      Total Bilirubin 0.7 mg/dL      eGFR Non African Amer 138 mL/min/1.73      Globulin 4.1 gm/dL      A/G Ratio 1.2 g/dL      BUN/Creatinine Ratio 20.4     Anion Gap 14.0 mmol/L     Narrative:      GFR Normal >60  Chronic Kidney Disease <60  Kidney Failure <15      Lipase [[id number]]  (Normal) Collected: 10/16/21 1524    Specimen: Blood Updated: 10/16/21 1556     Lipase 26 U/L     hCG, Serum, Qualitative [[id number]]  (Normal) Collected: 10/16/21 1524    Specimen: Blood Updated: 10/16/21 1550     HCG Qualitative Negative    CBC & Differential [[id number]]  (Abnormal) Collected: 10/16/21 1524    Specimen: Blood Updated: 10/16/21 1538    Narrative:      The following orders were created for panel order CBC & Differential.  Procedure                               Abnormality         Status                     ---------                               -----------         ------                     CBC Auto Differential[[id number]]        Abnormal            Final result                 Please view results for these tests on the individual orders.    CBC Auto Differential [[id number]]  (Abnormal) Collected: 10/16/21 1524    Specimen: Blood Updated: 10/16/21 1538     WBC 14.28 10*3/mm3      RBC 5.50 10*6/mm3      Hemoglobin 15.6 g/dL      Hematocrit 46.7 %      MCV 84.9 fL      MCH 28.4 pg      MCHC 33.4 g/dL      RDW 14.9 %      RDW-SD 45.7 fl      MPV 11.2 fL      Platelets 233 10*3/mm3      Neutrophil % 79.3 %      Lymphocyte % 14.2 %      Monocyte % 5.1 %      Eosinophil % 0.2 %      Basophil % 0.6 %      Immature Grans % 0.6 %       Neutrophils, Absolute 11.31 10*3/mm3      Lymphocytes, Absolute 2.03 10*3/mm3      Monocytes, Absolute 0.73 10*3/mm3      Eosinophils, Absolute 0.03 10*3/mm3      Basophils, Absolute 0.09 10*3/mm3      Immature Grans, Absolute 0.09 10*3/mm3      nRBC 0.0 /100 WBC     Extra Tubes [[id number]] Collected: 10/16/21 1535    Specimen: Blood, Venous Line Updated: 10/16/21 1535    Narrative:      The following orders were created for panel order Extra Tubes.  Procedure                               Abnormality         Status                     ---------                               -----------         ------                     [place] Top[[id number]]                                         In process                   Please view results for these tests on the individual orders.    [place] Top [[id number]] Collected: 10/16/21 1535    Specimen: Blood Updated: 10/16/21 1535        Imaging Results (Last 24 Hours)     Procedure Component Value Units Date/Time    CT Abdomen Pelvis With Contrast [[id number]] Collected: 10/16/21 1612     Updated: 10/16/21 1706    Addenda:         ADDENDUM   ADDENDUM #1       Report confirmed with nurse practitioner Tom Bae 5:02 PM  CST.    Electronically signed by:  Ean De Paz MD  10/16/2021 5:05 PM  CDT Workstation: [email or web address]    Signed: 10/16/21 1705 by Terra De Paz MD    Narrative:        CT ABDOMEN AND PELVIS WITH CONTRAST    HISTORY: Right lower quadrant pain    Following the intravenous administration of contrast, axial  spiral scans of the abdomen and pelvis were obtained.  Coronal  and sagital reconstructions were preformed.    This exam was performed according to our departmental  dose-optimization program, which includes automated exposure  control, adjustment of the mA and/or kV according to patient size  and/or use of iterative reconstruction technique.    DLP: 1205.60    COMPARISON: Report of July 10, 2011 reviewed.      FINDINGS:   Scans of the lung bases demonstrate  minimal dependent  atelectasis.    No acute osseous abnormality.  Degenerative changes lower thoracic spine.  Degenerative disc disease and spondylotic change L5-S1.    Fatty infiltration of the liver.  Cholecystectomy.  The spleen is unremarkable.  The pancreas is unremarkable.  The adrenal glands are unremarkable.    No renal or ureteral calculi.  No renal mass.  No hydronephrosis.    Unremarkable bladder.  No pelvic mass.  Prominent left-sided periuterine veins which may represent pelvic  congestive syndrome.    No abdominal aortic aneurysm.  No adenopathy.    Fluid-filled dilated appendix with appendicoliths and minimal  inflammatory changes adjacent fat, compatible with acute  appendicitis.  No perforation or abscess.   No bowel obstruction.  No free air.  No free fluid.    No hernia.      Impression:      CONCLUSION:  Fluid-filled dilated appendix with appendicoliths and minimal  inflammatory changes in the adjacent fat, compatible with acute  appendicitis.  No perforation or abscess.  Fatty infiltration of the liver.  Cholecystectomy.  Prominent left-sided periuterine veins which may represent pelvic  congestive syndrome.    [id number]    Electronically signed by:  Ean De Paz MD  10/16/2021 4:42 PM  CDT Workstation: [email or web address]            Assessment:    Active Hospital Problems    Diagnosis    • Acute appendicitis    • Class 3 severe obesity due to excess calories with serious comorbidity and body mass index (BMI) of 40.0 to 44.9 in adult ([facility])    • Type 2 diabetes mellitus with hyperglycemia, with long-term current use of insulin ([facility])              Plan:  1.  Acute appendicitis: Patient has been seen by general surgery with plans for laparoscopic appendectomy tonight.  Patient was given Zosyn in the emergency department.  We will continue.  2.  Diabetes mellitus: Sounds like her diabetes is fairly poorly controlled at home.  Will place on Levemir and sliding scale for now.  Will likely need dose adjustments  during her hospital stay.  3.  Probable fatty liver disease  4.  Morbid obesity  5.  History of migraines.  Continue Imitrex.  6.  DVT prophylaxis: SCDs.    I confirmed that the patient's Advance Care Plan is present, code status is documented, or surrogate decision maker is listed in the patient's medical record.     I have utilized all available immediate resources to obtain, update, or review the patient's current medications.     I discussed the patient's findings and my recommendations with: Patient and spouse        This document has been electronically signed by Alexandr Haq MD on October 16, 2021 19:13 CDT

## 2021-10-17 NOTE — ANESTHESIA PREPROCEDURE EVALUATION
Anesthesia Evaluation     Patient summary reviewed and Nursing notes reviewed   NPO Solid Status: > 6 hours  NPO Liquid Status: > 4 hours           Airway   Mallampati: III  TM distance: >3 FB  Neck ROM: limited  Possible difficult intubation  Dental    (+) poor dentition    Pulmonary - normal exam   (+) sleep apnea,   Cardiovascular - normal exam    (+) hypertension, hyperlipidemia,       Neuro/Psych  (+) headaches,     GI/Hepatic/Renal/Endo    (+) morbid obesity, GERD,  liver disease, diabetes mellitus type 2,     Musculoskeletal     Abdominal    Substance History      OB/GYN          Other                        Anesthesia Plan    ASA 3 - emergent     general     intravenous induction     Anesthetic plan, all risks, benefits, and alternatives have been provided, discussed and informed consent has been obtained with: patient.

## 2021-10-17 NOTE — DISCHARGE SUMMARY
[facility] Medicine Services  DISCHARGE SUMMARY       Date of Admission: 10/16/2021  Date of Discharge:  10/17/2021  Primary Care Physician: Lisa Veras MD    Presenting Problem/History of Present Illness:  Acute appendicitis, unspecified acute appendicitis type [K35.80]  Type 2 diabetes mellitus with hyperglycemia, with long-term current use of insulin (HCC) [E11.65, Z79.4]  Other specified diabetes mellitus with other specified complication, with long-term current use of insulin (HCC) [E13.69, Z79.4]  Class 3 severe obesity due to excess calories with serious comorbidity and body mass index (BMI) of 40.0 to 44.9 in adult (HCC) [E66.01, Z68.41]  Acute appendicitis with localized peritonitis, without perforation, abscess, or gangrene [K35.30]       Final Discharge Diagnoses:  Active Hospital Problems    Diagnosis    • Acute appendicitis    • Class 3 severe obesity due to excess calories with serious comorbidity and body mass index (BMI) of 40.0 to 44.9 in adult (HCC)    • Type 2 diabetes mellitus with hyperglycemia, with long-term current use of insulin (HCC)        Consults:   Consults     Date and Time Order Name Status Description    10/16/2021  6:32 PM Hospitalist (on-call MD unless specified)      10/16/2021  4:55 PM Surgery (on-call MD unless specified)            Procedures Performed: Procedure(s):  APPENDECTOMY LAPAROSCOPIC                Pertinent Test Results:   Lab Results (most recent)     Procedure Component Value Units Date/Time    Basic Metabolic Panel [[id number]]  (Abnormal) Collected: 10/17/21 0629    Specimen: Blood Updated: 10/17/21 0742     Glucose 273 mg/dL      BUN 9 mg/dL      Creatinine 0.49 mg/dL      Sodium 134 mmol/L      Potassium 4.1 mmol/L      Chloride 99 mmol/L      CO2 24.0 mmol/L      Calcium 8.4 mg/dL      eGFR Non African Amer 138 mL/min/1.73      BUN/Creatinine Ratio 18.4     Anion Gap 11.0 mmol/L     Narrative:      GFR Normal  >60  Chronic Kidney Disease <60  Kidney Failure <15      POC Glucose Once [[id number]]  (Abnormal) Collected: 10/17/21 0723    Specimen: Blood Updated: 10/17/21 0738     Glucose 278 mg/dL      Comment: RN NotifiedOperator: [id number] LEON LUCILLEMeter ID: [id number]       CBC & Differential [[id number]]  (Abnormal) Collected: 10/17/21 0629    Specimen: Blood Updated: 10/17/21 0732    Narrative:      The following orders were created for panel order CBC & Differential.  Procedure                               Abnormality         Status                     ---------                               -----------         ------                     CBC Auto Differential[[id number]]        Abnormal            Final result                 Please view results for these tests on the individual orders.    CBC Auto Differential [[id number]]  (Abnormal) Collected: 10/17/21 0629    Specimen: Blood Updated: 10/17/21 0732     WBC 11.24 10*3/mm3      RBC 4.40 10*6/mm3      Hemoglobin 12.5 g/dL      Comment: Post Surgery        Hematocrit 38.1 %      MCV 86.6 fL      MCH 28.4 pg      MCHC 32.8 g/dL      RDW 15.2 %      RDW-SD 48.4 fl      MPV 11.3 fL      Platelets 201 10*3/mm3      Neutrophil % 74.5 %      Lymphocyte % 18.7 %      Monocyte % 5.8 %      Eosinophil % 0.1 %      Basophil % 0.5 %      Immature Grans % 0.4 %      Neutrophils, Absolute 8.37 10*3/mm3      Lymphocytes, Absolute 2.10 10*3/mm3      Monocytes, Absolute 0.65 10*3/mm3      Eosinophils, Absolute 0.01 10*3/mm3      Basophils, Absolute 0.06 10*3/mm3      Immature Grans, Absolute 0.05 10*3/mm3      nRBC 0.0 /100 WBC     POC Glucose Once [[id number]]  (Abnormal) Collected: 10/16/21 2242    Specimen: Blood Updated: 10/16/21 2306     Glucose 292 mg/dL      Comment: RN NotifiedOperator: [id number] BRANT WILLMeter ID: [id number]       Extra Tubes [[id number]] Collected: 10/16/21 1535    Specimen: Blood, Venous Line Updated: 10/16/21 1945    Narrative:      The following  orders were created for panel order Extra Tubes.  Procedure                               Abnormality         Status                     ---------                               -----------         ------                     Salvador Top[[id number]]                                         Final result                 Please view results for these tests on the individual orders.    Salvador Top [[id number]] Collected: 10/16/21 1535    Specimen: Blood Updated: 10/16/21 1945     Extra Tube Hold for add-ons.     Comment: Auto resulted.       [place] Draw [[id number]] Collected: 10/16/21 1524    Specimen: Blood Updated: 10/16/21 1630    Narrative:      The following orders were created for panel order [place] Draw.  Procedure                               Abnormality         Status                     ---------                               -----------         ------                     Green Top (Gel)[[id number]]                                  Final result               Lavender Top[[id number]]                                     Final result               Gold Top - SST[[id number]]                                                              Light Blue Top[[id number]]                                   Final result                 Please view results for these tests on the individual orders.    Green Top (Gel) [[id number]] Collected: 10/16/21 1524    Specimen: Blood Updated: 10/16/21 1630     Extra Tube Hold for add-ons.     Comment: Auto resulted.       Lavender Top [[id number]] Collected: 10/16/21 1524    Specimen: Blood Updated: 10/16/21 1630     Extra Tube hold for add-on     Comment: Auto resulted       Light Blue Top [[id number]] Collected: 10/16/21 1524    Specimen: Blood Updated: 10/16/21 1630     Extra Tube hold for add-on     Comment: Auto resulted       Urinalysis With Microscopic If Indicated (No Culture) - Urine, Clean Catch [[id number]]  (Abnormal) Collected: 10/16/21 1546    Specimen: Urine, Clean Catch Updated: 10/16/21  1559     Color, UA Yellow     Appearance, UA Clear     pH, UA 5.5     Specific Gravity, UA 1.078     Comment: Result obtained by Refractometer        Glucose, UA >=1000 mg/dL (3+)     Ketones, UA 80 mg/dL (3+)     Bilirubin, UA Negative     Blood, UA Negative     Protein, UA Trace     Leuk Esterase, UA Negative     Nitrite, UA Negative     Urobilinogen, UA 0.2 E.U./dL    Narrative:      Urine microscopic not indicated.    Comprehensive Metabolic Panel [[id number]]  (Abnormal) Collected: 10/16/21 1524    Specimen: Blood Updated: 10/16/21 1556     Glucose 303 mg/dL      BUN 10 mg/dL      Creatinine 0.49 mg/dL      Sodium 134 mmol/L      Potassium 4.2 mmol/L      Chloride 97 mmol/L      CO2 23.0 mmol/L      Calcium 9.9 mg/dL      Total Protein 8.9 g/dL      Albumin 4.80 g/dL      ALT (SGPT) 51 U/L      AST (SGOT) 51 U/L      Alkaline Phosphatase 148 U/L      Total Bilirubin 0.7 mg/dL      eGFR Non African Amer 138 mL/min/1.73      Globulin 4.1 gm/dL      A/G Ratio 1.2 g/dL      BUN/Creatinine Ratio 20.4     Anion Gap 14.0 mmol/L     Narrative:      GFR Normal >60  Chronic Kidney Disease <60  Kidney Failure <15      Lipase [[id number]]  (Normal) Collected: 10/16/21 1524    Specimen: Blood Updated: 10/16/21 1556     Lipase 26 U/L     hCG, Serum, Qualitative [[id number]]  (Normal) Collected: 10/16/21 1524    Specimen: Blood Updated: 10/16/21 1550     HCG Qualitative Negative    CBC & Differential [[id number]]  (Abnormal) Collected: 10/16/21 1524    Specimen: Blood Updated: 10/16/21 1538    Narrative:      The following orders were created for panel order CBC & Differential.  Procedure                               Abnormality         Status                     ---------                               -----------         ------                     CBC Auto Differential[[id number]]        Abnormal            Final result                 Please view results for these tests on the individual orders.    CBC Auto Differential  [[id number]]  (Abnormal) Collected: 10/16/21 1524    Specimen: Blood Updated: 10/16/21 1538     WBC 14.28 10*3/mm3      RBC 5.50 10*6/mm3      Hemoglobin 15.6 g/dL      Hematocrit 46.7 %      MCV 84.9 fL      MCH 28.4 pg      MCHC 33.4 g/dL      RDW 14.9 %      RDW-SD 45.7 fl      MPV 11.2 fL      Platelets 233 10*3/mm3      Neutrophil % 79.3 %      Lymphocyte % 14.2 %      Monocyte % 5.1 %      Eosinophil % 0.2 %      Basophil % 0.6 %      Immature Grans % 0.6 %      Neutrophils, Absolute 11.31 10*3/mm3      Lymphocytes, Absolute 2.03 10*3/mm3      Monocytes, Absolute 0.73 10*3/mm3      Eosinophils, Absolute 0.03 10*3/mm3      Basophils, Absolute 0.09 10*3/mm3      Immature Grans, Absolute 0.09 10*3/mm3      nRBC 0.0 /100 WBC         Imaging Results (Most Recent)     Procedure Component Value Units Date/Time    CT Abdomen Pelvis With Contrast [[id number]] Collected: 10/16/21 1612     Updated: 10/16/21 1706    Addenda:         ADDENDUM   ADDENDUM #1       Report confirmed with nurse practitioner Tom Bae 5:02 PM  CST.    Electronically signed by:  Ean De Paz MD  10/16/2021 5:05 PM  CDT Workstation: [employer]    Signed: 10/16/21 1705 by Terra De Paz MD    Narrative:        CT ABDOMEN AND PELVIS WITH CONTRAST    HISTORY: Right lower quadrant pain    Following the intravenous administration of contrast, axial  spiral scans of the abdomen and pelvis were obtained.  Coronal  and sagital reconstructions were preformed.    This exam was performed according to our departmental  dose-optimization program, which includes automated exposure  control, adjustment of the mA and/or kV according to patient size  and/or use of iterative reconstruction technique.    DLP: 1205.60    COMPARISON: Report of July 10, 2011 reviewed.      FINDINGS:   Scans of the lung bases demonstrate minimal dependent  atelectasis.    No acute osseous abnormality.  Degenerative changes lower thoracic spine.  Degenerative disc disease  and spondylotic change L5-S1.    Fatty infiltration of the liver.  Cholecystectomy.  The spleen is unremarkable.  The pancreas is unremarkable.  The adrenal glands are unremarkable.    No renal or ureteral calculi.  No renal mass.  No hydronephrosis.    Unremarkable bladder.  No pelvic mass.  Prominent left-sided periuterine veins which may represent pelvic  congestive syndrome.    No abdominal aortic aneurysm.  No adenopathy.    Fluid-filled dilated appendix with appendicoliths and minimal  inflammatory changes adjacent fat, compatible with acute  appendicitis.  No perforation or abscess.   No bowel obstruction.  No free air.  No free fluid.    No hernia.      Impression:      CONCLUSION:  Fluid-filled dilated appendix with appendicoliths and minimal  inflammatory changes in the adjacent fat, compatible with acute  appendicitis.  No perforation or abscess.  Fatty infiltration of the liver.  Cholecystectomy.  Prominent left-sided periuterine veins which may represent pelvic  congestive syndrome.    [id number]    Electronically signed by:  Ean De Paz MD  10/16/2021 4:42 PM  CDT Workstation: [email or web address]          Chief Complaint on Day of Discharge: Improving abdominal pain    Hospital Course:  The patient is a 45 y.o. female who presented to [facility] with abdominal pain.  She was diagnosed with acute appendicitis.  General surgery was consulted and patient underwent laparoscopic appendectomy without issue.  She did well postoperatively and was discharged home in good condition.  Of note patient has suboptimal glycemic control.  She states that her glucose at home often runs in the 400 range.  Glucose during her hospital stay remained in the 200 range.  She was counseled on diet and weight control.  She has a follow-up appointment with her endocrinologist in approximately 3 weeks.  She will keep that appointment.  Follow-up with PCP in 1 to 2 weeks.  Follow-up general surgery clinic in 2  "weeks.    Condition on Discharge: Stable    Physical Exam on Discharge:  /75 (BP Location: Left arm, Patient Position: Lying)   Pulse 86   Temp 97.9 °F (36.6 °C) (Oral)   Resp 18   Ht 167.6 cm (66\")   Wt 113 kg (250 lb)   LMP 09/12/2021 (Exact Date)   SpO2 96%   Breastfeeding No   BMI 40.35 kg/m²   Physical Exam  Vitals reviewed.   Constitutional:       Appearance: She is well-developed.   HENT:      Head: Normocephalic and atraumatic.   Eyes:      Pupils: Pupils are equal, round, and reactive to light.   Cardiovascular:      Rate and Rhythm: Normal rate and regular rhythm.      Heart sounds: Normal heart sounds. No murmur heard.  No friction rub. No gallop.    Pulmonary:      Effort: Pulmonary effort is normal. No respiratory distress.      Breath sounds: Normal breath sounds. No wheezing or rales.   Chest:      Chest wall: No tenderness.   Abdominal:      General: Bowel sounds are normal. There is no distension.      Palpations: Abdomen is soft.      Tenderness: There is no abdominal tenderness. There is no guarding.   Musculoskeletal:      Cervical back: Normal range of motion and neck supple.   Skin:     General: Skin is warm and dry.   Psychiatric:         Behavior: Behavior normal.         Thought Content: Thought content normal.           Discharge Disposition:  Home or Self Care    Discharge Medications:     Discharge Medications      New Medications      Instructions Start Date   HYDROcodone-acetaminophen 7.5-325 MG per tablet  Commonly known as: NORCO   1 tablet, Oral, Every 4 Hours PRN         Continue These Medications      Instructions Start Date   Advocate Insulin Pen Needles 31G X 8 MM misc  Generic drug: Insulin Pen Needle   Inject 4 times daily      Aimovig 70 MG/ML prefilled syringe  Generic drug: Erenumab-aooe   70 mg, Subcutaneous, Every 30 Days      amitriptyline 50 MG tablet  Commonly known as: ELAVIL   50 mg, Oral, Daily      atorvastatin 40 MG tablet  Commonly known as: " LIPITOR   40 mg, Oral, Daily      BASAGLAR KWIKPEN 100 UNIT/ML injection pen   30  units qhs      Insulin Lispro (1 Unit Dial) 100 UNIT/ML solution pen-injector  Commonly known as: HumaLOG KwikPen   6 up to 25 units with each meal TID      Jardiance 25 MG tablet tablet  Generic drug: empagliflozin   25 mg, Oral, Daily, One tablet daily before breakfast       levothyroxine 88 MCG tablet  Commonly known as: Synthroid   88 mcg, Oral, Daily      Milk Thistle 150 MG capsule   1 capsule, Oral, Daily      ondansetron ODT 4 MG disintegrating tablet  Commonly known as: ZOFRAN-ODT   4 mg, Oral, Every 8 Hours PRN      ONE TOUCH ULTRA TEST test strip  Generic drug: glucose blood   No dose, route, or frequency recorded.      glucose blood test strip   Test 3 times daily, E11.9      promethazine 25 MG tablet  Commonly known as: PHENERGAN   Take one tablet for nausea with migraine may repeat in six hours. Max 2 tabs in 24 hours.      rizatriptan MLT 10 MG disintegrating tablet  Commonly known as: MAXALT-MLT   10 mg, Translingual, Once As Needed      SITagliptin 100 MG tablet  Commonly known as: Januvia   100 mg by mouth  daily before breakfast       SUMAtriptan 50 MG tablet  Commonly known as: IMITREX   50 mg, Oral, Daily      vitamin E 400 UNIT capsule   400 Units, Oral, Daily             Discharge Diet:   Diet Instructions     Diet: Consistent Carbohydrate      Discharge Diet: Consistent Carbohydrate          Activity at Discharge:   Activity Instructions     Activity as Tolerated          Follow-up Appointments:   Future Appointments   Date Time Provider Department Center   11/11/2021  9:30 AM Chucky De Paz APRN [facility]       Test Results Pending at Discharge:   Pending Labs     Order Current Status    Tissue Pathology Exam Collected (10/16/21 2039)                This document has been electronically signed by Alexandr Haq MD on October 17, 2021 10:55 CDT      Time: 35 min

## 2021-10-17 NOTE — ANESTHESIA PROCEDURE NOTES
Airway  Urgency: elective    Date/Time: 10/16/2021 7:45 PM  Airway not difficult    General Information and Staff    Patient location during procedure: OR  CRNA: Orestes Graham CRNA    Indications and Patient Condition  Indications for airway management: airway protection    Preoxygenated: yes  Mask difficulty assessment: 1 - vent by mask    Final Airway Details  Final airway type: endotracheal airway      Successful airway: ETT  Cuffed: yes   Successful intubation technique: video laryngoscopy and RSI  Facilitating devices/methods: cricoid pressure  Endotracheal tube insertion site: oral  Blade: Anna  Blade size: 3  ETT size (mm): 7.0  Cormack-Lehane Classification: grade IIa - partial view of glottis  Placement verified by: chest auscultation and capnometry   Measured from: lips  Number of attempts at approach: 1  Assessment: lips, teeth, and gum same as pre-op and atraumatic intubation    Additional Comments  Teeth checked after intubation.  No damage noted.

## 2021-10-17 NOTE — BRIEF OP NOTE
APPENDECTOMY LAPAROSCOPIC POSSIBLE OPEN  Progress Note    Diane Esquivel  10/16/2021    Pre-op Diagnosis:   Acute appendicitis, unspecified acute appendicitis type [K35.80]  Type 2 diabetes mellitus with hyperglycemia, with long-term current use of insulin ([facility]) [E11.65, Z79.4]  Class 3 severe obesity due to excess calories with serious comorbidity and body mass index (BMI) of 40.0 to 44.9 in adult (HCC) [E66.01, Z68.41]       Post-Op Diagnosis Codes:     * Acute appendicitis with localized peritonitis without perforation [K35.30]     * Type 2 diabetes mellitus with hyperglycemia, with long-term current use of insulin (HCC) [E11.65, Z79.4]     * Class 3 severe obesity due to excess calories with serious comorbidity and body mass index (BMI) of 40.0 to 44.9 in adult (HCC) [E66.01, Z68.41]    Procedure/CPT® Codes:    Procedure(s):  APPENDECTOMY LAPAROSCOPIC    Surgeon(s):  Prudencio Petty MD    Anesthesia: General    Staff:   Circulator: Chelly Veras RN  Scrub Person: Sharon De Paz  Assistant: Kika Sandhu CSA  Assistant: Kika Sandhu CSA      Estimated Blood Loss: minimal    Urine Voided: * No values recorded between 10/16/2021  7:36 PM and 10/16/2021  9:43 PM *    Specimens:                Specimens     ID Source Type Tests Collected By Collected At Frozen?    A Large Intestine, Appendix Tissue · TISSUE PATHOLOGY EXAM   Prudencio Petty MD 10/16/21 2039     This specimen was not marked as sent.            Drains: * No LDAs found *    Findings: Acute suppurative appendicitis. Long appendix with normal base and inflammation and swelling along distal 3/4 of length.     Complications: none apparent.    Assistant: Kika Sandhu CSA  was responsible for performing the following activities: Retraction, Suturing, Closing, Placing Dressing and Held/Positioned Camera and their skilled assistance was necessary for the success of this case.    Prudencio Petty MD     Date: 10/16/2021  Time: 21:51 CDT

## 2021-10-17 NOTE — ANESTHESIA POSTPROCEDURE EVALUATION
Patient: Diane Esquivel    Procedure Summary     Date: 10/16/21 Room / Location: [facility] OR 09 / [facility] OR    Anesthesia Start: 1938 Anesthesia Stop: 2153    Procedure: APPENDECTOMY LAPAROSCOPIC (N/A Abdomen) Diagnosis:       Acute appendicitis with localized peritonitis without perforation      Type 2 diabetes mellitus with hyperglycemia, with long-term current use of insulin ([facility])      Class 3 severe obesity due to excess calories with serious comorbidity and body mass index (BMI) of 40.0 to 44.9 in adult ([facility])      (Acute appendicitis, unspecified acute appendicitis type [K35.80])      (Type 2 diabetes mellitus with hyperglycemia, with long-term current use of insulin ([facility]) [E11.65, Z79.4])      (Class 3 severe obesity due to excess calories with serious comorbidity and body mass index (BMI) of 40.0 to 44.9 in adult ([facility]) [E66.01, Z68.41])    Surgeons: Prudencio Petty MD Provider: Orestes Graham CRNA    Anesthesia Type: general ASA Status: 3 - Emergent          Anesthesia Type: general    Vitals  No vitals data found for the desired time range.          Post Anesthesia Care and Evaluation    Patient location during evaluation: bedside  Patient participation: complete - patient cannot participate  Level of consciousness: awake  Pain score: 0  Pain management: adequate  Airway patency: patent  Anesthetic complications: No anesthetic complications  PONV Status: none  Cardiovascular status: acceptable  Respiratory status: acceptable  Hydration status: acceptable

## 2021-10-17 NOTE — PLAN OF CARE
Goal Outcome Evaluation:  Plan of Care Reviewed With: patient        Progress: no change  Outcome Summary: VSS, no complaints of nausea, minimal pain, pt denies need for pain meds at this time, resting well, will continue to monitor

## 2021-10-17 NOTE — PROGRESS NOTES
GENERAL SURGERY PROGRESS NOTE     LOS: 0 days     Chief Complaint:     Abdominal pain     Interval History:   S/P appendectomy. Reports significant symptomatic improvement in abdominal pain post surgery.    Medication Review:   amitriptyline, 50 mg, Oral, Daily  atorvastatin, 40 mg, Oral, Daily  insulin aspart, 0-24 Units, Subcutaneous, TID AC  insulin detemir, 25 Units, Subcutaneous, Nightly  levothyroxine, 88 mcg, Oral, Daily  piperacillin-tazobactam, 4.5 g, Intravenous, Q6H  sodium chloride, 10 mL, Intravenous, Q12H         Objective     Vital Signs:  Temp:  [97 °F (36.1 °C)-98 °F (36.7 °C)] 97.9 °F (36.6 °C)  Heart Rate:  [75-97] 86  Resp:  [16-20] 18  BP: (109-215)/() 136/75    Intake/Output Summary (Last 24 hours) at 10/17/2021 0918  Last data filed at 10/17/2021 0300  Gross per 24 hour   Intake 1000 ml   Output 500 ml   Net 500 ml       Physical Exam           Results Review:    Results from last 7 days   Lab Units 10/17/21  0629 10/16/21  1524 10/16/21  1524   SODIUM mmol/L 134*  --  134*   POTASSIUM mmol/L 4.1  --  4.2   CHLORIDE mmol/L 99  --  97*   CO2 mmol/L 24.0  --  23.0   BUN mg/dL 9  --  10   CREATININE mg/dL 0.49*  --  0.49*   GLUCOSE mg/dL 273*   < > 303*   CALCIUM mg/dL 8.4*  --  9.9    < > = values in this interval not displayed.     Results from last 7 days   Lab Units 10/17/21  0629 10/16/21  1524   WBC 10*3/mm3 11.24* 14.28*   HEMOGLOBIN g/dL 12.5 15.6   HEMATOCRIT % 38.1 46.7*   PLATELETS 10*3/mm3 201 233       Assessment:    Type 2 diabetes mellitus with hyperglycemia, with long-term current use of insulin ([facility])    Class 3 severe obesity due to excess calories with serious comorbidity and body mass index (BMI) of 40.0 to 44.9 in adult ([facility])    Acute appendicitis      S/P Laparoscopic appendectomy for acute appendicitis    Plan:    Progressing well. Prepare for discharge.  Antibiotics reviewed.  Zosyn to be discontinued after next dose.    This document has been electronically signed by  Prudencio Petty MD on October 17, 2021 09:18 CDT

## 2021-10-21 LAB
LAB AP CASE REPORT: NORMAL
PATH REPORT.FINAL DX SPEC: NORMAL

## 2021-11-01 ENCOUNTER — OFFICE VISIT (OUTPATIENT)
Dept: SURGERY | Facility: CLINIC | Age: 46
End: 2021-11-01

## 2021-11-01 VITALS
HEART RATE: 98 BPM | WEIGHT: 246.2 LBS | SYSTOLIC BLOOD PRESSURE: 132 MMHG | TEMPERATURE: 98.3 F | BODY MASS INDEX: 39.57 KG/M2 | DIASTOLIC BLOOD PRESSURE: 84 MMHG | HEIGHT: 66 IN

## 2021-11-01 DIAGNOSIS — Z90.49 S/P APPENDECTOMY: Primary | ICD-10-CM

## 2021-11-01 PROCEDURE — 99024 POSTOP FOLLOW-UP VISIT: CPT | Performed by: NURSE PRACTITIONER

## 2021-11-01 NOTE — PATIENT INSTRUCTIONS
"BMI for Adults  What is BMI?  Body mass index (BMI) is a number that is calculated from a person's weight and height. BMI can help estimate how much of a person's weight is composed of fat. BMI does not measure body fat directly. Rather, it is an alternative to procedures that directly measure body fat, which can be difficult and expensive.  BMI can help identify people who may be at higher risk for certain medical problems.  What are BMI measurements used for?  BMI is used as a screening tool to identify possible weight problems. It helps determine whether a person is obese, overweight, a healthy weight, or underweight.  BMI is useful for:  · Identifying a weight problem that may be related to a medical condition or may increase the risk for medical problems.  · Promoting changes, such as changes in diet and exercise, to help reach a healthy weight. BMI screening can be repeated to see if these changes are working.  How is BMI calculated?  BMI involves measuring your weight in relation to your height. Both height and weight are measured, and the BMI is calculated from those numbers. This can be done either in English (U[place] or metric measurements. Note that charts and online BMI calculators are available to help you find your BMI quickly and easily without having to do these calculations yourself.  To calculate your BMI in English (U.S[place] measurements:    1. Measure your weight in pounds (lb).  2. Multiply the number of pounds by 703.  ? For example, for a person who weighs 180 lb, multiply that number by 703, which equals 126,540.  3. Measure your height in inches. Then multiply that number by itself to get a measurement called \"inches squared.\"  ? For example, for a person who is 70 inches tall, the \"inches squared\" measurement is 70 inches x 70 inches, which equals 4,900 inches squared.  4. Divide the total from step 2 (number of lb x 703) by the total from step 3 (inches squared): 126,540 ÷ 4,900 = 25.8. This is " "your BMI.    To calculate your BMI in metric measurements:  1. Measure your weight in kilograms (kg).  2. Measure your height in meters (m). Then multiply that number by itself to get a measurement called \"meters squared.\"  ? For example, for a person who is 1.75 m tall, the \"meters squared\" measurement is 1.75 m x 1.75 m, which is equal to 3.1 meters squared.  3. Divide the number of kilograms (your weight) by the meters squared number. In this example: 70 ÷ 3.1 = 22.6. This is your BMI.  What do the results mean?  BMI charts are used to identify whether you are underweight, normal weight, overweight, or obese. The following guidelines will be used:  · Underweight: BMI less than 18.5.  · Normal weight: BMI between 18.5 and 24.9.  · Overweight: BMI between 25 and 29.9.  · Obese: BMI of 30 or above.  Keep these notes in mind:  · Weight includes both fat and muscle, so someone with a muscular build, such as an athlete, may have a BMI that is higher than 24.9. In cases like these, BMI is not an accurate measure of body fat.  · To determine if excess body fat is the cause of a BMI of 25 or higher, further assessments may need to be done by a health care provider.  · BMI is usually interpreted in the same way for men and women.  Where to find more information  For more information about BMI, including tools to quickly calculate your BMI, go to these websites:  · Centers for Disease Control and Prevention: www.cdc.gov  · American Heart Association: www.heart.org  · National Heart, Lung, and Blood [place]: www.nhlbi.nih.gov  Summary  · Body mass index (BMI) is a number that is calculated from a person's weight and height.  · BMI may help estimate how much of a person's weight is composed of fat. BMI can help identify those who may be at higher risk for certain medical problems.  · BMI can be measured using English measurements or metric measurements.  · BMI charts are used to identify whether you are underweight, normal " weight, overweight, or obese.  This information is not intended to replace advice given to you by your health care provider. Make sure you discuss any questions you have with your health care provider.  Document Revised: 09/09/2020 Document Reviewed: 07/17/2020  Elsevier Patient Education © 2021 [employer]

## 2021-11-01 NOTE — PROGRESS NOTES
CHIEF COMPLAINT:   Chief Complaint   Patient presents with   • Post-op Follow-up     Post operative Lap. Appendectomy 10-16-21       HPI: This patient is seen for a post-operatively following laparoscopic appendectomy by Dr. Nelson.     Patient  is doing well. Eating well without any significant nausea. Having good bowel function. No problems with constipation or diarrhea. No urinary complaints. Denies fever. Ambulating well and slowly returning to normal activities.    PATHOLOGY:         PHYSICAL EXAM:    ABD: Incisions are healing well without any erythema or signs of infection. Abdomen is soft and non distended.     ASSESSMENT    Diagnoses and all orders for this visit:    1. S/P appendectomy (Primary)        PLAN:  1. The patient will follow-up on a prn basis unless there are any problems.  2. May shower.   3. Gradually return to normal activity without restrictions                      This document has been electronically signed by YESICA Escalera on November 1, 2021 10:56 CDT

## 2021-11-09 ENCOUNTER — OFFICE VISIT (OUTPATIENT)
Dept: SURGERY | Facility: CLINIC | Age: 46
End: 2021-11-09

## 2021-11-09 VITALS
WEIGHT: 247 LBS | HEIGHT: 66 IN | DIASTOLIC BLOOD PRESSURE: 84 MMHG | HEART RATE: 98 BPM | TEMPERATURE: 97.7 F | SYSTOLIC BLOOD PRESSURE: 132 MMHG | BODY MASS INDEX: 39.7 KG/M2

## 2021-11-09 DIAGNOSIS — T81.49XA POSTOPERATIVE CELLULITIS OF SURGICAL WOUND: ICD-10-CM

## 2021-11-09 DIAGNOSIS — Z90.49 S/P APPENDECTOMY: Primary | ICD-10-CM

## 2021-11-09 PROCEDURE — 99024 POSTOP FOLLOW-UP VISIT: CPT | Performed by: NURSE PRACTITIONER

## 2021-11-09 RX ORDER — INSULIN GLARGINE 100 [IU]/ML
28-30 INJECTION, SOLUTION SUBCUTANEOUS
COMMUNITY
End: 2021-11-16 | Stop reason: DRUGHIGH

## 2021-11-09 RX ORDER — CEPHALEXIN 500 MG/1
500 CAPSULE ORAL 3 TIMES DAILY
Qty: 21 CAPSULE | Refills: 0 | Status: SHIPPED | OUTPATIENT
Start: 2021-11-09 | End: 2021-11-16

## 2021-11-09 NOTE — PROGRESS NOTES
CHIEF COMPLAINT:   Chief Complaint   Patient presents with   • Follow-up     Lap. Appendectomy 10-16-21. Recheck Incision       HPI: This patient is seen for a post-operatively following  laparoscopic appendectomy by Dr. Nelson.     Patient presents today with concerns that one of her incision sites is infected. She states it is red and tender and she claims she has also had some nausea. Otherwise she is eating well without any significant nausea. Having good bowel function. No problems with constipation or diarrhea. No urinary complaints. Denies fever. Ambulating well and slowly returning to normal activities.    PATHOLOGY:       PHYSICAL EXAM:    ABD: Abdomen is soft and non distended. Suprapubic incision site does exhibit some erythema and warmth consistent with cellulitis. No drainage noted.      ASSESSMENT    Diagnoses and all orders for this visit:    1. S/P appendectomy (Primary)    2. Postoperative cellulitis of surgical wound  -     cephalexin (KEFLEX) 500 MG capsule; Take 1 capsule by mouth 3 (Three) Times a Day for 7 days.  Dispense: 21 capsule; Refill: 0        PLAN:    1. The patient will follow up in one week or sooner if concerns arise. She is educated if she begins to get fever or other concerning symptoms she should seek further medical treatment.   2. Local wound care to incision sites.                       This document has been electronically signed by YESICA Escalera on November 9, 2021 11:43 CST

## 2021-11-11 ENCOUNTER — OFFICE VISIT (OUTPATIENT)
Dept: ENDOCRINOLOGY | Facility: CLINIC | Age: 46
End: 2021-11-11

## 2021-11-11 ENCOUNTER — LAB (OUTPATIENT)
Dept: LAB | Facility: HOSPITAL | Age: 46
End: 2021-11-11

## 2021-11-11 VITALS
BODY MASS INDEX: 39.6 KG/M2 | WEIGHT: 246.4 LBS | SYSTOLIC BLOOD PRESSURE: 132 MMHG | DIASTOLIC BLOOD PRESSURE: 82 MMHG | HEART RATE: 113 BPM | HEIGHT: 66 IN | OXYGEN SATURATION: 97 %

## 2021-11-11 DIAGNOSIS — Z79.4 TYPE 2 DIABETES MELLITUS WITH HYPERGLYCEMIA, WITH LONG-TERM CURRENT USE OF INSULIN (HCC): Primary | ICD-10-CM

## 2021-11-11 DIAGNOSIS — E55.9 VITAMIN D DEFICIENCY: ICD-10-CM

## 2021-11-11 DIAGNOSIS — E06.3 HYPOTHYROIDISM DUE TO HASHIMOTO'S THYROIDITIS: ICD-10-CM

## 2021-11-11 DIAGNOSIS — E11.69 MIXED DIABETIC HYPERLIPIDEMIA ASSOCIATED WITH TYPE 2 DIABETES MELLITUS (HCC): ICD-10-CM

## 2021-11-11 DIAGNOSIS — E11.65 TYPE 2 DIABETES MELLITUS WITH HYPERGLYCEMIA, WITH LONG-TERM CURRENT USE OF INSULIN (HCC): Primary | ICD-10-CM

## 2021-11-11 DIAGNOSIS — E78.2 MIXED DIABETIC HYPERLIPIDEMIA ASSOCIATED WITH TYPE 2 DIABETES MELLITUS (HCC): ICD-10-CM

## 2021-11-11 DIAGNOSIS — E03.8 HYPOTHYROIDISM DUE TO HASHIMOTO'S THYROIDITIS: ICD-10-CM

## 2021-11-11 DIAGNOSIS — E11.59 HYPERTENSION ASSOCIATED WITH DIABETES (HCC): ICD-10-CM

## 2021-11-11 DIAGNOSIS — I15.2 HYPERTENSION ASSOCIATED WITH DIABETES (HCC): ICD-10-CM

## 2021-11-11 LAB
25(OH)D3 SERPL-MCNC: 24.6 NG/ML
ALBUMIN SERPL-MCNC: 4.1 G/DL (ref 3.5–5.2)
ALBUMIN/GLOB SERPL: 1.2 G/DL
ALP SERPL-CCNC: 128 U/L (ref 39–117)
ALT SERPL W P-5'-P-CCNC: 61 U/L (ref 1–33)
ANION GAP SERPL CALCULATED.3IONS-SCNC: 12 MMOL/L (ref 5–15)
AST SERPL-CCNC: 67 U/L (ref 1–32)
BASOPHILS # BLD AUTO: 0.05 10*3/MM3 (ref 0–0.2)
BASOPHILS NFR BLD AUTO: 0.7 % (ref 0–1.5)
BILIRUB SERPL-MCNC: 0.4 MG/DL (ref 0–1.2)
BUN SERPL-MCNC: 7 MG/DL (ref 6–20)
BUN/CREAT SERPL: 14 (ref 7–25)
CALCIUM SPEC-SCNC: 9.7 MG/DL (ref 8.6–10.5)
CHLORIDE SERPL-SCNC: 101 MMOL/L (ref 98–107)
CHOLEST SERPL-MCNC: 253 MG/DL (ref 0–200)
CO2 SERPL-SCNC: 22 MMOL/L (ref 22–29)
CREAT SERPL-MCNC: 0.5 MG/DL (ref 0.57–1)
DEPRECATED RDW RBC AUTO: 45.2 FL (ref 37–54)
EOSINOPHIL # BLD AUTO: 0.1 10*3/MM3 (ref 0–0.4)
EOSINOPHIL NFR BLD AUTO: 1.3 % (ref 0.3–6.2)
ERYTHROCYTE [DISTWIDTH] IN BLOOD BY AUTOMATED COUNT: 14.7 % (ref 12.3–15.4)
GFR SERPL CREATININE-BSD FRML MDRD: 134 ML/MIN/1.73
GLOBULIN UR ELPH-MCNC: 3.5 GM/DL
GLUCOSE SERPL-MCNC: 280 MG/DL (ref 65–99)
HBA1C MFR BLD: 11.2 % (ref 4.8–5.6)
HCT VFR BLD AUTO: 42.3 % (ref 34–46.6)
HDLC SERPL-MCNC: 32 MG/DL (ref 40–60)
HGB BLD-MCNC: 14.2 G/DL (ref 12–15.9)
IMM GRANULOCYTES # BLD AUTO: 0.04 10*3/MM3 (ref 0–0.05)
IMM GRANULOCYTES NFR BLD AUTO: 0.5 % (ref 0–0.5)
LDLC SERPL CALC-MCNC: 183 MG/DL (ref 0–100)
LDLC/HDLC SERPL: 5.64 {RATIO}
LYMPHOCYTES # BLD AUTO: 2.21 10*3/MM3 (ref 0.7–3.1)
LYMPHOCYTES NFR BLD AUTO: 28.8 % (ref 19.6–45.3)
MCH RBC QN AUTO: 28.5 PG (ref 26.6–33)
MCHC RBC AUTO-ENTMCNC: 33.6 G/DL (ref 31.5–35.7)
MCV RBC AUTO: 84.9 FL (ref 79–97)
MONOCYTES # BLD AUTO: 0.41 10*3/MM3 (ref 0.1–0.9)
MONOCYTES NFR BLD AUTO: 5.3 % (ref 5–12)
NEUTROPHILS NFR BLD AUTO: 4.86 10*3/MM3 (ref 1.7–7)
NEUTROPHILS NFR BLD AUTO: 63.4 % (ref 42.7–76)
NRBC BLD AUTO-RTO: 0 /100 WBC (ref 0–0.2)
PLATELET # BLD AUTO: 261 10*3/MM3 (ref 140–450)
PMV BLD AUTO: 11.2 FL (ref 6–12)
POTASSIUM SERPL-SCNC: 4.2 MMOL/L (ref 3.5–5.2)
PROT SERPL-MCNC: 7.6 G/DL (ref 6–8.5)
RBC # BLD AUTO: 4.98 10*6/MM3 (ref 3.77–5.28)
SODIUM SERPL-SCNC: 135 MMOL/L (ref 136–145)
TRIGL SERPL-MCNC: 202 MG/DL (ref 0–150)
TSH SERPL DL<=0.05 MIU/L-ACNC: 4.88 UIU/ML (ref 0.27–4.2)
VIT B12 BLD-MCNC: 895 PG/ML (ref 211–946)
VLDLC SERPL-MCNC: 38 MG/DL (ref 5–40)
WBC # BLD AUTO: 7.67 10*3/MM3 (ref 3.4–10.8)

## 2021-11-11 PROCEDURE — 80061 LIPID PANEL: CPT | Performed by: NURSE PRACTITIONER

## 2021-11-11 PROCEDURE — 36415 COLL VENOUS BLD VENIPUNCTURE: CPT | Performed by: NURSE PRACTITIONER

## 2021-11-11 PROCEDURE — 80053 COMPREHEN METABOLIC PANEL: CPT | Performed by: NURSE PRACTITIONER

## 2021-11-11 PROCEDURE — 84443 ASSAY THYROID STIM HORMONE: CPT | Performed by: NURSE PRACTITIONER

## 2021-11-11 PROCEDURE — 82607 VITAMIN B-12: CPT | Performed by: NURSE PRACTITIONER

## 2021-11-11 PROCEDURE — 85025 COMPLETE CBC W/AUTO DIFF WBC: CPT | Performed by: NURSE PRACTITIONER

## 2021-11-11 PROCEDURE — 82043 UR ALBUMIN QUANTITATIVE: CPT | Performed by: NURSE PRACTITIONER

## 2021-11-11 PROCEDURE — 99214 OFFICE O/P EST MOD 30 MIN: CPT | Performed by: NURSE PRACTITIONER

## 2021-11-11 PROCEDURE — 83036 HEMOGLOBIN GLYCOSYLATED A1C: CPT | Performed by: NURSE PRACTITIONER

## 2021-11-11 PROCEDURE — 82306 VITAMIN D 25 HYDROXY: CPT | Performed by: NURSE PRACTITIONER

## 2021-11-11 PROCEDURE — 82570 ASSAY OF URINE CREATININE: CPT | Performed by: NURSE PRACTITIONER

## 2021-11-11 PROCEDURE — 84156 ASSAY OF PROTEIN URINE: CPT | Performed by: NURSE PRACTITIONER

## 2021-11-11 NOTE — PROGRESS NOTES
"Chief Complaint  Diabetes    Subjective          Diane Esquivel presents to [facility]  History of Present Illness       In office visit    Primary care provider      Lisa Veras MD       46-year-old female presents for follow-up       Reason diabetes mellitus type 2    Timing constant    Quality not controlled      Lab Results   Component Value Date    HGBA1C 9.20 (H) 08/11/2021         Severity is moderate    Diagnosed in 2010       Macrovascular complications none      Microvascular complications neuropathy       Current diabetes regimen     Insulin, oral medications       Current monitoring regimen     Home blood test    Check 4 times daily     Variable from 80 up to 175                 She had her appendix removed since last visit and then developed post op infection           Review of Systems - General ROS: negative      Objective   Vital Signs:   /82   Pulse 113   Ht 167.6 cm (66\")   Wt 112 kg (246 lb 6.4 oz)   SpO2 97%   BMI 39.77 kg/m²     Physical Exam  Constitutional:       Appearance: Normal appearance.   Cardiovascular:      Rate and Rhythm: Regular rhythm.      Heart sounds: Normal heart sounds.   Pulmonary:      Breath sounds: Normal breath sounds.   Musculoskeletal:      Cervical back: Normal range of motion.   Neurological:      Mental Status: She is alert.        Result Review :   The following data was reviewed by: YESICA Villanueva on 11/11/2021:  Common labs    Common Labsle 8/11/21 8/11/21 8/11/21 8/11/21 8/11/21 10/16/21 10/16/21 10/17/21 10/17/21    1021 1021 1021 1021 1038 1524 1524 0629 0629   Glucose   292 (A)    303 (A)  273 (A)   BUN   12    10  9   Creatinine   0.50 (A)    0.49 (A)  0.49 (A)   eGFR Non African Am   135    138  138   Sodium   136    134 (A)  134 (A)   Potassium   4.5    4.2  4.1   Chloride   103    97 (A)  99   Calcium   9.3    9.9  8.4 (A)   Albumin   4.00    4.80     Total Bilirubin   0.3    0.7   "   Alkaline Phosphatase   109    148 (A)     AST (SGOT)   61 (A)    51 (A)     ALT (SGPT)   56 (A)    51 (A)     WBC 8.36     14.28 (A)  11.24 (A)    Hemoglobin 14.5     15.6  12.5    Hematocrit 43.5     46.7 (A)  38.1    Platelets 277     233  201    Total Cholesterol    243 (A)        Triglycerides    167 (A)        HDL Cholesterol    38 (A)        LDL Cholesterol     174 (A)        Hemoglobin A1C  9.20 (A)          Microalbumin, Urine     15.8       (A) Abnormal value       Comments are available for some flowsheets but are not being displayed.                     Assessment and Plan    Diagnoses and all orders for this visit:    1. Type 2 diabetes mellitus with hyperglycemia, with long-term current use of insulin (HCC) (Primary)  -     CBC & Differential  -     Comprehensive Metabolic Panel  -     Hemoglobin A1c  -     Lipid Panel  -     Microalbumin / Creatinine Urine Ratio - Urine, Clean Catch  -     Protein / Creatinine Ratio, Urine - Urine, Clean Catch  -     TSH  -     Vitamin B12  -     Vitamin D 25 Hydroxy    2. Hypertension associated with diabetes (HCC)  -     CBC & Differential  -     Comprehensive Metabolic Panel  -     Hemoglobin A1c  -     Lipid Panel  -     Microalbumin / Creatinine Urine Ratio - Urine, Clean Catch  -     Protein / Creatinine Ratio, Urine - Urine, Clean Catch  -     TSH  -     Vitamin B12  -     Vitamin D 25 Hydroxy    3. Mixed diabetic hyperlipidemia associated with type 2 diabetes mellitus (HCC)  -     CBC & Differential  -     Comprehensive Metabolic Panel  -     Hemoglobin A1c  -     Lipid Panel  -     Microalbumin / Creatinine Urine Ratio - Urine, Clean Catch  -     Protein / Creatinine Ratio, Urine - Urine, Clean Catch  -     TSH  -     Vitamin B12  -     Vitamin D 25 Hydroxy    4. Hypothyroidism due to Hashimoto's thyroiditis  -     CBC & Differential  -     Comprehensive Metabolic Panel  -     Hemoglobin A1c  -     Lipid Panel  -     Microalbumin / Creatinine Urine Ratio -  Urine, Clean Catch  -     Protein / Creatinine Ratio, Urine - Urine, Clean Catch  -     TSH  -     Vitamin B12  -     Vitamin D 25 Hydroxy    5. Vitamin D deficiency  -     CBC & Differential  -     Comprehensive Metabolic Panel  -     Hemoglobin A1c  -     Lipid Panel  -     Microalbumin / Creatinine Urine Ratio - Urine, Clean Catch  -     Protein / Creatinine Ratio, Urine - Urine, Clean Catch  -     TSH  -     Vitamin B12  -     Vitamin D 25 Hydroxy             Glycemic Management:       diabetes mellitus type 2 with hyperglycemia      Lab Results   Component Value Date    HGBA1C 9.20 (H) 08/11/2021             Taking Basaglar  34  units once daily          taking Januvia 100 mg one daily          trulicity 0.75 mg weekly  --stop due to nausea                Synjardy makes sick had to stop          Actos 15 mg daily due to fatty liver --stop      Watch fluid accumulation         Jardiance taking 25 mg one daily         Humalog before each meal         Taking 12 units up to 16  units TID before each meal -- increase up to 18 units      Blood sugar              Insulin      200-250                    2 units  251-300                    3 units  301-350                    4 units   Above 351                5 units         Stop glipizide        Lipid Management        Total Cholesterol   Date Value Ref Range Status   08/11/2021 243 (H) 0 - 200 mg/dL Final     Triglycerides   Date Value Ref Range Status   08/11/2021 167 (H) 0 - 150 mg/dL Final     HDL Cholesterol   Date Value Ref Range Status   08/11/2021 38 (L) 40 - 60 mg/dL Final     LDL Cholesterol    Date Value Ref Range Status   08/11/2021 174 (H) 0 - 100 mg/dL Final        Taking lipitor 40 mg qhs           Blood Pressure Management:       At goal       not on ACEi              Microvascular Complication Monitoring:       Last eye exam --- overdue      -----------     Last Microalbumin-Proteinuria Assessment                     Lab Results   Component Value Date      MICROALBUR 9.5 05/28/2020            Neuropathy     Taking elavil 50 mg                  Preventive Care:      Nonsmoker          Weight Related:              Bone Health           Component      Latest Ref Rng & Units 5/28/2020   25 Hydroxy, Vitamin D      30.0 - 100.0 ng/ml 25.7 (L)          taking vitamin d supplement -- keep            Thyroid Health   hypothyroidism      Taking levothyroxine 50 mcg take 1 1/2 daily      Lab Results   Component Value Date    TSH 3.940 08/11/2021             Other Diabetes Related Aspects        Lab Results   Component Value Date    [id number] 08/11/2021           diagnosed with sleep apnea      using CPAP                             Follow Up   No follow-ups on file.  Patient was given instructions and counseling regarding her condition or for health maintenance advice. Please see specific information pulled into the AVS if appropriate.         This document has been electronically signed by YESICA Villanueva on November 11, 2021 09:52 CST.

## 2021-11-12 LAB
ALBUMIN UR-MCNC: 15.8 MG/DL
CREAT UR-MCNC: 220.5 MG/DL
CREAT UR-MCNC: 220.5 MG/DL
MICROALBUMIN/CREAT UR: 71.7 MG/G
PROT UR-MCNC: 49 MG/DL
PROT/CREAT UR: 222.2 MG/G CREA (ref 0–200)

## 2021-11-16 ENCOUNTER — OFFICE VISIT (OUTPATIENT)
Dept: SURGERY | Facility: CLINIC | Age: 46
End: 2021-11-16

## 2021-11-16 VITALS
DIASTOLIC BLOOD PRESSURE: 90 MMHG | WEIGHT: 247.4 LBS | HEART RATE: 90 BPM | BODY MASS INDEX: 39.76 KG/M2 | TEMPERATURE: 97.2 F | HEIGHT: 66 IN | OXYGEN SATURATION: 98 % | SYSTOLIC BLOOD PRESSURE: 130 MMHG

## 2021-11-16 DIAGNOSIS — Z90.49 S/P APPENDECTOMY: Primary | ICD-10-CM

## 2021-11-16 PROCEDURE — 99024 POSTOP FOLLOW-UP VISIT: CPT | Performed by: NURSE PRACTITIONER

## 2021-11-16 NOTE — PROGRESS NOTES
CHIEF COMPLAINT:   Chief Complaint   Patient presents with   • Post-op     10/16 Lap appy       HPI: This patient is seen for a post-operatively following laparoscopic appendectomy by Dr. Petty. Last visit she was noted to have some cellulitis surrounding one of her incision sites. She states this is now much better and she will be completing the last of her antibiotics today.      Patient is now doing well. Eating well without any significant nausea. Having good bowel function. No problems with constipation or diarrhea. No urinary complaints. Denies fever. Ambulating well and slowly returning to normal activities.    PATHOLOGY:         PHYSICAL EXAM:    ABD: Incisions are healing well without any erythema or signs of infection. No further indication of cellulitis.  Abdomen is soft and non distended.      ASSESSMENT    Diagnoses and all orders for this visit:    1. S/P appendectomy (Primary)        PLAN:  1. The patient will follow-up on a prn basis unless there are any problems.  2. Gradually return to normal activity without restrictions                      This document has been electronically signed by YESICA Escalera on November 16, 2021 10:17 CST

## 2021-11-16 NOTE — PATIENT INSTRUCTIONS
"BMI for Adults  What is BMI?  Body mass index (BMI) is a number that is calculated from a person's weight and height. BMI can help estimate how much of a person's weight is composed of fat. BMI does not measure body fat directly. Rather, it is an alternative to procedures that directly measure body fat, which can be difficult and expensive.  BMI can help identify people who may be at higher risk for certain medical problems.  What are BMI measurements used for?  BMI is used as a screening tool to identify possible weight problems. It helps determine whether a person is obese, overweight, a healthy weight, or underweight.  BMI is useful for:  · Identifying a weight problem that may be related to a medical condition or may increase the risk for medical problems.  · Promoting changes, such as changes in diet and exercise, to help reach a healthy weight. BMI screening can be repeated to see if these changes are working.  How is BMI calculated?  BMI involves measuring your weight in relation to your height. Both height and weight are measured, and the BMI is calculated from those numbers. This can be done either in English (U.S[place] or metric measurements. Note that charts and online BMI calculators are available to help you find your BMI quickly and easily without having to do these calculations yourself.  To calculate your BMI in English ([place].S[place] measurements:    1. Measure your weight in pounds (lb).  2. Multiply the number of pounds by 703.  ? For example, for a person who weighs 180 lb, multiply that number by 703, which equals 126,540.  3. Measure your height in inches. Then multiply that number by itself to get a measurement called \"inches squared.\"  ? For example, for a person who is 70 inches tall, the \"inches squared\" measurement is 70 inches x 70 inches, which equals 4,900 inches squared.  4. Divide the total from step 2 (number of lb x 703) by the total from step 3 (inches squared): 126,540 ÷ 4,900 = 25.8. This is " "your BMI.    To calculate your BMI in metric measurements:  1. Measure your weight in kilograms (kg).  2. Measure your height in meters (m). Then multiply that number by itself to get a measurement called \"meters squared.\"  ? For example, for a person who is 1.75 m tall, the \"meters squared\" measurement is 1.75 m x 1.75 m, which is equal to 3.1 meters squared.  3. Divide the number of kilograms (your weight) by the meters squared number. In this example: 70 ÷ 3.1 = 22.6. This is your BMI.  What do the results mean?  BMI charts are used to identify whether you are underweight, normal weight, overweight, or obese. The following guidelines will be used:  · Underweight: BMI less than 18.5.  · Normal weight: BMI between 18.5 and 24.9.  · Overweight: BMI between 25 and 29.9.  · Obese: BMI of 30 or above.  Keep these notes in mind:  · Weight includes both fat and muscle, so someone with a muscular build, such as an athlete, may have a BMI that is higher than 24.9. In cases like these, BMI is not an accurate measure of body fat.  · To determine if excess body fat is the cause of a BMI of 25 or higher, further assessments may need to be done by a health care provider.  · BMI is usually interpreted in the same way for men and women.  Where to find more information  For more information about BMI, including tools to quickly calculate your BMI, go to these websites:  · Centers for Disease Control and Prevention: www.cdc.gov  · American Heart Association: www.heart.org  · National Heart, Lung, and Blood [place]: www.nhlbi.nih.gov  Summary  · Body mass index (BMI) is a number that is calculated from a person's weight and height.  · BMI may help estimate how much of a person's weight is composed of fat. BMI can help identify those who may be at higher risk for certain medical problems.  · BMI can be measured using English measurements or metric measurements.  · BMI charts are used to identify whether you are underweight, normal " weight, overweight, or obese.  This information is not intended to replace advice given to you by your health care provider. Make sure you discuss any questions you have with your health care provider.  Document Revised: 09/09/2020 Document Reviewed: 07/17/2020  Elsevier Patient Education © 2021 [employer]

## 2021-12-29 ENCOUNTER — HOSPITAL ENCOUNTER (EMERGENCY)
Facility: HOSPITAL | Age: 46
Discharge: HOME | End: 2021-12-29
Attending: EMERGENCY MEDICINE | Admitting: EMERGENCY MEDICINE

## 2021-12-29 ENCOUNTER — APPOINTMENT (OUTPATIENT)
Dept: GENERAL RADIOLOGY | Facility: HOSPITAL | Age: 46
End: 2021-12-29

## 2021-12-29 VITALS
DIASTOLIC BLOOD PRESSURE: 74 MMHG | HEART RATE: 88 BPM | HEIGHT: 66 IN | WEIGHT: 247 LBS | OXYGEN SATURATION: 96 % | BODY MASS INDEX: 39.7 KG/M2 | RESPIRATION RATE: 18 BRPM | SYSTOLIC BLOOD PRESSURE: 114 MMHG | TEMPERATURE: 97.6 F

## 2021-12-29 DIAGNOSIS — I15.9 SECONDARY HYPERTENSION: ICD-10-CM

## 2021-12-29 DIAGNOSIS — R07.89 ATYPICAL CHEST PAIN: Primary | ICD-10-CM

## 2021-12-29 LAB
ALBUMIN SERPL-MCNC: 3.8 G/DL (ref 3.5–5.2)
ALBUMIN/GLOB SERPL: 1.1 G/DL
ALP SERPL-CCNC: 137 U/L (ref 39–117)
ALT SERPL W P-5'-P-CCNC: 55 U/L (ref 1–33)
ANION GAP SERPL CALCULATED.3IONS-SCNC: 13 MMOL/L (ref 5–15)
AST SERPL-CCNC: 71 U/L (ref 1–32)
B-HCG UR QL: NEGATIVE
BASOPHILS # BLD AUTO: 0.08 10*3/MM3 (ref 0–0.2)
BASOPHILS NFR BLD AUTO: 0.8 % (ref 0–1.5)
BILIRUB SERPL-MCNC: 0.4 MG/DL (ref 0–1.2)
BUN SERPL-MCNC: 15 MG/DL (ref 6–20)
BUN/CREAT SERPL: 30.6 (ref 7–25)
CALCIUM SPEC-SCNC: 9.3 MG/DL (ref 8.6–10.5)
CHLORIDE SERPL-SCNC: 98 MMOL/L (ref 98–107)
CO2 SERPL-SCNC: 22 MMOL/L (ref 22–29)
CREAT SERPL-MCNC: 0.49 MG/DL (ref 0.57–1)
DEPRECATED RDW RBC AUTO: 44 FL (ref 37–54)
EOSINOPHIL # BLD AUTO: 0.18 10*3/MM3 (ref 0–0.4)
EOSINOPHIL NFR BLD AUTO: 1.8 % (ref 0.3–6.2)
ERYTHROCYTE [DISTWIDTH] IN BLOOD BY AUTOMATED COUNT: 14.3 % (ref 12.3–15.4)
GFR SERPL CREATININE-BSD FRML MDRD: 137 ML/MIN/1.73
GLOBULIN UR ELPH-MCNC: 3.6 GM/DL
GLUCOSE SERPL-MCNC: 284 MG/DL (ref 65–99)
HCT VFR BLD AUTO: 43.4 % (ref 34–46.6)
HGB BLD-MCNC: 14.4 G/DL (ref 12–15.9)
HOLD SPECIMEN: NORMAL
HOLD SPECIMEN: NORMAL
IMM GRANULOCYTES # BLD AUTO: 0.09 10*3/MM3 (ref 0–0.05)
IMM GRANULOCYTES NFR BLD AUTO: 0.9 % (ref 0–0.5)
LYMPHOCYTES # BLD AUTO: 3.05 10*3/MM3 (ref 0.7–3.1)
LYMPHOCYTES NFR BLD AUTO: 30.4 % (ref 19.6–45.3)
MCH RBC QN AUTO: 28.3 PG (ref 26.6–33)
MCHC RBC AUTO-ENTMCNC: 33.2 G/DL (ref 31.5–35.7)
MCV RBC AUTO: 85.3 FL (ref 79–97)
MONOCYTES # BLD AUTO: 0.61 10*3/MM3 (ref 0.1–0.9)
MONOCYTES NFR BLD AUTO: 6.1 % (ref 5–12)
NEUTROPHILS NFR BLD AUTO: 6.03 10*3/MM3 (ref 1.7–7)
NEUTROPHILS NFR BLD AUTO: 60 % (ref 42.7–76)
NRBC BLD AUTO-RTO: 0 /100 WBC (ref 0–0.2)
PLATELET # BLD AUTO: 285 10*3/MM3 (ref 140–450)
PMV BLD AUTO: 10.9 FL (ref 6–12)
POTASSIUM SERPL-SCNC: 3.8 MMOL/L (ref 3.5–5.2)
PROT SERPL-MCNC: 7.4 G/DL (ref 6–8.5)
QT INTERVAL: 366 MS
QTC INTERVAL: 447 MS
RBC # BLD AUTO: 5.09 10*6/MM3 (ref 3.77–5.28)
SODIUM SERPL-SCNC: 133 MMOL/L (ref 136–145)
TROPONIN T SERPL-MCNC: <0.01 NG/ML (ref 0–0.03)
WBC NRBC COR # BLD: 10.04 10*3/MM3 (ref 3.4–10.8)
WHOLE BLOOD HOLD SPECIMEN: NORMAL
WHOLE BLOOD HOLD SPECIMEN: NORMAL

## 2021-12-29 PROCEDURE — 71045 X-RAY EXAM CHEST 1 VIEW: CPT

## 2021-12-29 PROCEDURE — 80053 COMPREHEN METABOLIC PANEL: CPT | Performed by: EMERGENCY MEDICINE

## 2021-12-29 PROCEDURE — 81025 URINE PREGNANCY TEST: CPT | Performed by: EMERGENCY MEDICINE

## 2021-12-29 PROCEDURE — 85025 COMPLETE CBC W/AUTO DIFF WBC: CPT | Performed by: EMERGENCY MEDICINE

## 2021-12-29 PROCEDURE — 99283 EMERGENCY DEPT VISIT LOW MDM: CPT

## 2021-12-29 PROCEDURE — 84484 ASSAY OF TROPONIN QUANT: CPT | Performed by: EMERGENCY MEDICINE

## 2021-12-29 PROCEDURE — 93010 ELECTROCARDIOGRAM REPORT: CPT | Performed by: INTERNAL MEDICINE

## 2021-12-29 PROCEDURE — 93005 ELECTROCARDIOGRAM TRACING: CPT | Performed by: EMERGENCY MEDICINE

## 2021-12-29 RX ORDER — SODIUM CHLORIDE 0.9 % (FLUSH) 0.9 %
10 SYRINGE (ML) INJECTION AS NEEDED
Status: DISCONTINUED | OUTPATIENT
Start: 2021-12-29 | End: 2021-12-29 | Stop reason: HOSPADM

## 2021-12-29 RX ORDER — ASPIRIN 325 MG
325 TABLET ORAL ONCE
Status: COMPLETED | OUTPATIENT
Start: 2021-12-29 | End: 2021-12-29

## 2021-12-29 RX ADMIN — ASPIRIN 325 MG: 325 TABLET ORAL at 01:21

## 2022-01-17 PROCEDURE — 87635 SARS-COV-2 COVID-19 AMP PRB: CPT | Performed by: NURSE PRACTITIONER

## 2022-02-14 ENCOUNTER — LAB (OUTPATIENT)
Dept: LAB | Facility: HOSPITAL | Age: 47
End: 2022-02-14

## 2022-02-14 ENCOUNTER — OFFICE VISIT (OUTPATIENT)
Dept: ENDOCRINOLOGY | Facility: CLINIC | Age: 47
End: 2022-02-14

## 2022-02-14 VITALS
BODY MASS INDEX: 38.91 KG/M2 | WEIGHT: 242.1 LBS | OXYGEN SATURATION: 98 % | HEART RATE: 100 BPM | HEIGHT: 66 IN | SYSTOLIC BLOOD PRESSURE: 120 MMHG | DIASTOLIC BLOOD PRESSURE: 94 MMHG

## 2022-02-14 DIAGNOSIS — Z79.4 TYPE 2 DIABETES MELLITUS WITH HYPERGLYCEMIA, WITH LONG-TERM CURRENT USE OF INSULIN: Primary | ICD-10-CM

## 2022-02-14 DIAGNOSIS — E11.69 MIXED DIABETIC HYPERLIPIDEMIA ASSOCIATED WITH TYPE 2 DIABETES MELLITUS: ICD-10-CM

## 2022-02-14 DIAGNOSIS — E11.65 TYPE 2 DIABETES MELLITUS WITH HYPERGLYCEMIA, WITH LONG-TERM CURRENT USE OF INSULIN: Primary | ICD-10-CM

## 2022-02-14 DIAGNOSIS — E03.9 ACQUIRED HYPOTHYROIDISM: ICD-10-CM

## 2022-02-14 DIAGNOSIS — E78.2 MIXED DIABETIC HYPERLIPIDEMIA ASSOCIATED WITH TYPE 2 DIABETES MELLITUS: ICD-10-CM

## 2022-02-14 DIAGNOSIS — E11.59 HYPERTENSION ASSOCIATED WITH DIABETES: ICD-10-CM

## 2022-02-14 DIAGNOSIS — E55.9 VITAMIN D DEFICIENCY: ICD-10-CM

## 2022-02-14 DIAGNOSIS — I15.2 HYPERTENSION ASSOCIATED WITH DIABETES: ICD-10-CM

## 2022-02-14 LAB
25(OH)D3 SERPL-MCNC: 22.6 NG/ML (ref 30–100)
ALBUMIN SERPL-MCNC: 4.2 G/DL (ref 3.5–5.2)
ALBUMIN UR-MCNC: 4.5 MG/DL
ALBUMIN/GLOB SERPL: 1.2 G/DL
ALP SERPL-CCNC: 128 U/L (ref 39–117)
ALT SERPL W P-5'-P-CCNC: 43 U/L (ref 1–33)
ANION GAP SERPL CALCULATED.3IONS-SCNC: 10 MMOL/L (ref 5–15)
AST SERPL-CCNC: 42 U/L (ref 1–32)
BASOPHILS # BLD AUTO: 0.05 10*3/MM3 (ref 0–0.2)
BASOPHILS NFR BLD AUTO: 0.6 % (ref 0–1.5)
BILIRUB SERPL-MCNC: 0.3 MG/DL (ref 0–1.2)
BUN SERPL-MCNC: 10 MG/DL (ref 6–20)
BUN/CREAT SERPL: 20 (ref 7–25)
CALCIUM SPEC-SCNC: 9.4 MG/DL (ref 8.6–10.5)
CHLORIDE SERPL-SCNC: 101 MMOL/L (ref 98–107)
CHOLEST SERPL-MCNC: 241 MG/DL (ref 0–200)
CO2 SERPL-SCNC: 25 MMOL/L (ref 22–29)
CREAT SERPL-MCNC: 0.5 MG/DL (ref 0.57–1)
CREAT UR-MCNC: 94.6 MG/DL
DEPRECATED RDW RBC AUTO: 43.3 FL (ref 37–54)
EOSINOPHIL # BLD AUTO: 0.15 10*3/MM3 (ref 0–0.4)
EOSINOPHIL NFR BLD AUTO: 1.8 % (ref 0.3–6.2)
ERYTHROCYTE [DISTWIDTH] IN BLOOD BY AUTOMATED COUNT: 14 % (ref 12.3–15.4)
GFR SERPL CREATININE-BSD FRML MDRD: 134 ML/MIN/1.73
GLOBULIN UR ELPH-MCNC: 3.5 GM/DL
GLUCOSE SERPL-MCNC: 228 MG/DL (ref 65–99)
HBA1C MFR BLD: 9.7 % (ref 4.8–5.6)
HCT VFR BLD AUTO: 41.7 % (ref 34–46.6)
HDLC SERPL-MCNC: 34 MG/DL (ref 40–60)
HGB BLD-MCNC: 13.9 G/DL (ref 12–15.9)
IMM GRANULOCYTES # BLD AUTO: 0.04 10*3/MM3 (ref 0–0.05)
IMM GRANULOCYTES NFR BLD AUTO: 0.5 % (ref 0–0.5)
LDLC SERPL CALC-MCNC: 174 MG/DL (ref 0–100)
LDLC/HDLC SERPL: 5.04 {RATIO}
LYMPHOCYTES # BLD AUTO: 2.9 10*3/MM3 (ref 0.7–3.1)
LYMPHOCYTES NFR BLD AUTO: 35.4 % (ref 19.6–45.3)
MCH RBC QN AUTO: 28.8 PG (ref 26.6–33)
MCHC RBC AUTO-ENTMCNC: 33.3 G/DL (ref 31.5–35.7)
MCV RBC AUTO: 86.3 FL (ref 79–97)
MICROALBUMIN/CREAT UR: 47.6 MG/G
MONOCYTES # BLD AUTO: 0.47 10*3/MM3 (ref 0.1–0.9)
MONOCYTES NFR BLD AUTO: 5.7 % (ref 5–12)
NEUTROPHILS NFR BLD AUTO: 4.58 10*3/MM3 (ref 1.7–7)
NEUTROPHILS NFR BLD AUTO: 56 % (ref 42.7–76)
NRBC BLD AUTO-RTO: 0 /100 WBC (ref 0–0.2)
PLATELET # BLD AUTO: 255 10*3/MM3 (ref 140–450)
PMV BLD AUTO: 11 FL (ref 6–12)
POTASSIUM SERPL-SCNC: 4 MMOL/L (ref 3.5–5.2)
PROT SERPL-MCNC: 7.7 G/DL (ref 6–8.5)
RBC # BLD AUTO: 4.83 10*6/MM3 (ref 3.77–5.28)
SODIUM SERPL-SCNC: 136 MMOL/L (ref 136–145)
TRIGL SERPL-MCNC: 178 MG/DL (ref 0–150)
TSH SERPL DL<=0.05 MIU/L-ACNC: 4.57 UIU/ML (ref 0.27–4.2)
VIT B12 BLD-MCNC: 703 PG/ML (ref 211–946)
VLDLC SERPL-MCNC: 33 MG/DL (ref 5–40)
WBC NRBC COR # BLD: 8.19 10*3/MM3 (ref 3.4–10.8)

## 2022-02-14 PROCEDURE — 80050 GENERAL HEALTH PANEL: CPT | Performed by: NURSE PRACTITIONER

## 2022-02-14 PROCEDURE — 82570 ASSAY OF URINE CREATININE: CPT | Performed by: NURSE PRACTITIONER

## 2022-02-14 PROCEDURE — 80061 LIPID PANEL: CPT | Performed by: NURSE PRACTITIONER

## 2022-02-14 PROCEDURE — 99214 OFFICE O/P EST MOD 30 MIN: CPT | Performed by: NURSE PRACTITIONER

## 2022-02-14 PROCEDURE — 82306 VITAMIN D 25 HYDROXY: CPT | Performed by: NURSE PRACTITIONER

## 2022-02-14 PROCEDURE — 36415 COLL VENOUS BLD VENIPUNCTURE: CPT | Performed by: NURSE PRACTITIONER

## 2022-02-14 PROCEDURE — 82043 UR ALBUMIN QUANTITATIVE: CPT | Performed by: NURSE PRACTITIONER

## 2022-02-14 PROCEDURE — 82607 VITAMIN B-12: CPT | Performed by: NURSE PRACTITIONER

## 2022-02-14 PROCEDURE — 83036 HEMOGLOBIN GLYCOSYLATED A1C: CPT | Performed by: NURSE PRACTITIONER

## 2022-02-14 RX ORDER — INSULIN GLARGINE 100 [IU]/ML
INJECTION, SOLUTION SUBCUTANEOUS
Qty: 12 PEN | Refills: 3 | Status: SHIPPED | OUTPATIENT
Start: 2022-02-14

## 2022-02-14 RX ORDER — LEVOTHYROXINE SODIUM 88 UG/1
88 TABLET ORAL DAILY
Qty: 90 TABLET | Refills: 3 | Status: SHIPPED | OUTPATIENT
Start: 2022-02-14 | End: 2023-02-14

## 2022-02-14 RX ORDER — INSULIN LISPRO 100 [IU]/ML
INJECTION, SOLUTION INTRAVENOUS; SUBCUTANEOUS
Qty: 18 PEN | Refills: 3 | Status: SHIPPED | OUTPATIENT
Start: 2022-02-14

## 2022-02-14 NOTE — PROGRESS NOTES
"Chief Complaint  Diabetes    Subjective          Diane Esquivel presents to [facility]  History of Present Illness       In office visit    Primary care provider      Lisa Veras MD     46-year-old female presents for follow-up    Reason diabetes mellitus type 2    Diagnosed in 2010    Timing constant    Quality not controlled    Severity is moderate        Lab Results   Component Value Date    HGBA1C 11.20 (H) 11/11/2021             Macrovascular complications none      Microvascular complications neuropathy         Current diabetes regimen      Insulin, oral medications         Current monitoring regimen      Home blood test     Check 4 times daily      States under 200       Review of Systems - General ROS: negative                 Objective   Vital Signs:   /94   Pulse 100   Ht 167.6 cm (66\")   Wt 110 kg (242 lb 1.6 oz)   SpO2 98%   BMI 39.08 kg/m²     Physical Exam  Constitutional:       Appearance: Normal appearance.   Cardiovascular:      Rate and Rhythm: Regular rhythm.      Heart sounds: Normal heart sounds.   Pulmonary:      Breath sounds: Normal breath sounds.   Musculoskeletal:      Cervical back: Normal range of motion.   Neurological:      General: No focal deficit present.      Mental Status: She is alert.   Psychiatric:         Mood and Affect: Mood normal.         Thought Content: Thought content normal.         Judgment: Judgment normal.        Result Review :   The following data was reviewed by: YESICA Villanueva on 02/14/2022:  Common labs    Common Labsle 10/17/21 10/17/21 11/11/21 11/11/21 11/11/21 11/11/21 11/11/21 12/29/21 12/29/21    0629 0629 1017 1017 1017 1017 1055 0037 0041   Glucose  273 (A)  280 (A)     284 (A)   BUN  9  7     15   Creatinine  0.49 (A)  0.50 (A)     0.49 (A)   eGFR Non African Am  138  134     137   Sodium  134 (A)  135 (A)     133 (A)   Potassium  4.1  4.2     3.8   Chloride  99  101     98 "   Calcium  8.4 (A)  9.7     9.3   Albumin    4.10     3.80   Total Bilirubin    0.4     0.4   Alkaline Phosphatase    128 (A)     137 (A)   AST (SGOT)    67 (A)     71 (A)   ALT (SGPT)    61 (A)     55 (A)   WBC 11.24 (A)  7.67     10.04    Hemoglobin 12.5  14.2     14.4    Hematocrit 38.1  42.3     43.4    Platelets 201  261     285    Total Cholesterol     253 (A)       Triglycerides     202 (A)       HDL Cholesterol     32 (A)       LDL Cholesterol      183 (A)       Hemoglobin A1C      11.20 (A)      Microalbumin, Urine       15.8     (A) Abnormal value       Comments are available for some flowsheets but are not being displayed.                     Assessment and Plan    Diagnoses and all orders for this visit:    1. Type 2 diabetes mellitus with hyperglycemia, with long-term current use of insulin (HCC) (Primary)  -     Comprehensive Metabolic Panel  -     CBC & Differential  -     Hemoglobin A1c  -     Lipid Panel  -     Microalbumin / Creatinine Urine Ratio - Urine, Clean Catch  -     TSH  -     Vitamin B12  -     Vitamin D 25 Hydroxy    2. Hypertension associated with diabetes ([facility])  -     Comprehensive Metabolic Panel  -     CBC & Differential  -     Hemoglobin A1c  -     Lipid Panel  -     Microalbumin / Creatinine Urine Ratio - Urine, Clean Catch  -     TSH  -     Vitamin B12  -     Vitamin D 25 Hydroxy    3. Mixed diabetic hyperlipidemia associated with type 2 diabetes mellitus ([facility])  -     Comprehensive Metabolic Panel  -     CBC & Differential  -     Hemoglobin A1c  -     Lipid Panel  -     Microalbumin / Creatinine Urine Ratio - Urine, Clean Catch  -     TSH  -     Vitamin B12  -     Vitamin D 25 Hydroxy    4. Acquired hypothyroidism  -     Comprehensive Metabolic Panel  -     CBC & Differential  -     Hemoglobin A1c  -     Lipid Panel  -     Microalbumin / Creatinine Urine Ratio - Urine, Clean Catch  -     TSH  -     Vitamin B12  -     Vitamin D 25 Hydroxy    5. Vitamin D deficiency  -      Comprehensive Metabolic Panel  -     CBC & Differential  -     Hemoglobin A1c  -     Lipid Panel  -     Microalbumin / Creatinine Urine Ratio - Urine, Clean Catch  -     TSH  -     Vitamin B12  -     Vitamin D 25 Hydroxy    Other orders  -     Insulin Glargine (BASAGLAR KWIKPEN) 100 UNIT/ML injection pen; 40  units qhs  Dispense: 12 pen; Refill: 3  -     glucose blood test strip; Test 4 times daily , E10.65  Dispense: 360 each; Refill: 3  -     empagliflozin (Jardiance) 25 MG tablet tablet; Take 1 tablet by mouth Daily. One tablet daily before breakfast  Dispense: 90 tablet; Refill: 3  -     Insulin Lispro, 1 Unit Dial, (HumaLOG KwikPen) 100 UNIT/ML solution pen-injector; 6 up to 25 units with each meal TID  Dispense: 18 pen; Refill: 3  -     SITagliptin (Januvia) 100 MG tablet; 100 mg by mouth  daily before breakfast  Dispense: 90 tablet; Refill: 3  -     levothyroxine (Synthroid) 88 MCG tablet; Take 1 tablet by mouth Daily.  Dispense: 90 tablet; Refill: 3           Glycemic Management:       diabetes mellitus type 2 with hyperglycemia        Lab Results   Component Value Date    HGBA1C 11.20 (H) 11/11/2021                Taking Basaglar  32  units once daily          taking Januvia 100 mg one daily          trulicity 0.75 mg weekly  --stop due to nausea                Synjardy makes sick had to stop          Actos 15 mg daily due to fatty liver --stop      Watch fluid accumulation         Jardiance taking 25 mg one daily         Humalog before each meal         Taking 12 units up to 16  units TID before each meal     Blood sugar              Insulin      200-250                    2 units  251-300                    3 units  301-350                    4 units   Above 351                5 units         Stop glipizide        Lipid Management              Total Cholesterol   Date Value Ref Range Status   08/11/2021 243 (H) 0 - 200 mg/dL Final            Triglycerides   Date Value Ref Range Status   08/11/2021 167 (H)  0 - 150 mg/dL Final            HDL Cholesterol   Date Value Ref Range Status   08/11/2021 38 (L) 40 - 60 mg/dL Final            LDL Cholesterol    Date Value Ref Range Status   08/11/2021 174 (H) 0 - 100 mg/dL Final         Taking lipitor 40 mg qhs           Blood Pressure Management:       At goal       not on ACEi              Microvascular Complication Monitoring:       Last eye exam --- overdue      -----------     Last Microalbumin-Proteinuria Assessment                     Lab Results   Component Value Date     MICROALBUR 9.5 05/28/2020            Neuropathy     Taking elavil 50 mg --no longer taking                  Preventive Care:      Nonsmoker          Weight Related:     Patient's Body mass index is 39.08 kg/m². indicating that she is morbidly obese (BMI > 40 or > 35 with obesity - related health condition). Obesity-related health conditions include the following: diabetes mellitus. Obesity is unchanged. BMI is is above average; no BMI management plan is appropriate. We discussed portion control and increasing exercise..                Bone Health           Component      Latest Ref Rng & Units 5/28/2020   25 Hydroxy, Vitamin D      30.0 - 100.0 ng/ml 25.7 (L)          taking vitamin d supplement -- keep            Thyroid Health   hypothyroidism      Taking levothyroxine 88 mcg daily            Lab Results   Component Value Date     TSH 3.940 08/11/2021               Other Diabetes Related Aspects               Lab Results   Component Value Date     [id number] 08/11/2021             diagnosed with sleep apnea      using CPAP                              Follow Up   No follow-ups on file.  Patient was given instructions and counseling regarding her condition or for health maintenance advice. Please see specific information pulled into the AVS if appropriate.         This document has been electronically signed by YESICA Villanueva on February 14, 2022 09:58 CST.

## 2022-04-20 PROCEDURE — 87510 GARDNER VAG DNA DIR PROBE: CPT | Performed by: NURSE PRACTITIONER

## 2022-04-20 PROCEDURE — 87660 TRICHOMONAS VAGIN DIR PROBE: CPT | Performed by: NURSE PRACTITIONER

## 2022-04-20 PROCEDURE — 87480 CANDIDA DNA DIR PROBE: CPT | Performed by: NURSE PRACTITIONER

## 2022-11-08 ENCOUNTER — HOSPITAL ENCOUNTER (EMERGENCY)
Age: 47
Discharge: HOME | End: 2022-11-09
Attending: EMERGENCY MEDICINE

## 2022-11-08 ENCOUNTER — APPOINTMENT (OUTPATIENT)
Dept: GENERAL RADIOLOGY | Age: 47
End: 2022-11-08

## 2022-11-08 VITALS
OXYGEN SATURATION: 96 % | TEMPERATURE: 97.9 F | HEART RATE: 82 BPM | SYSTOLIC BLOOD PRESSURE: 125 MMHG | RESPIRATION RATE: 18 BRPM | DIASTOLIC BLOOD PRESSURE: 89 MMHG

## 2022-11-08 DIAGNOSIS — I10 HYPERTENSION, UNSPECIFIED TYPE: ICD-10-CM

## 2022-11-08 DIAGNOSIS — R53.1 GENERALIZED WEAKNESS: Primary | ICD-10-CM

## 2022-11-08 LAB
ADENOVIRUS BY PCR: NOT DETECTED
ALBUMIN SERPL-MCNC: 3.7 G/DL (ref 3.5–5.2)
ALP BLD-CCNC: 124 U/L (ref 35–104)
ALT SERPL-CCNC: 36 U/L (ref 5–33)
ANION GAP SERPL CALCULATED.3IONS-SCNC: 9 MMOL/L (ref 7–19)
AST SERPL-CCNC: 46 U/L (ref 5–32)
BASOPHILS ABSOLUTE: 0.1 K/UL (ref 0–0.2)
BASOPHILS RELATIVE PERCENT: 0.5 % (ref 0–1)
BILIRUB SERPL-MCNC: 0.5 MG/DL (ref 0.2–1.2)
BILIRUBIN URINE: NEGATIVE
BLOOD, URINE: NEGATIVE
BORDETELLA PARAPERTUSSIS BY PCR: NOT DETECTED
BORDETELLA PERTUSSIS BY PCR: NOT DETECTED
BUN BLDV-MCNC: 13 MG/DL (ref 6–20)
CALCIUM SERPL-MCNC: 9.9 MG/DL (ref 8.6–10)
CHLAMYDOPHILIA PNEUMONIAE BY PCR: NOT DETECTED
CHLORIDE BLD-SCNC: 99 MMOL/L (ref 98–111)
CLARITY: ABNORMAL
CO2: 25 MMOL/L (ref 22–29)
COLOR: ABNORMAL
CORONAVIRUS 229E BY PCR: NOT DETECTED
CORONAVIRUS HKU1 BY PCR: NOT DETECTED
CORONAVIRUS NL63 BY PCR: NOT DETECTED
CORONAVIRUS OC43 BY PCR: NOT DETECTED
CREAT SERPL-MCNC: 0.3 MG/DL (ref 0.5–0.9)
EOSINOPHILS ABSOLUTE: 0.1 K/UL (ref 0–0.6)
EOSINOPHILS RELATIVE PERCENT: 1 % (ref 0–5)
GFR SERPL CREATININE-BSD FRML MDRD: >60 ML/MIN/{1.73_M2}
GLUCOSE BLD-MCNC: 205 MG/DL (ref 70–99)
GLUCOSE BLD-MCNC: 219 MG/DL (ref 74–109)
GLUCOSE URINE: 250 MG/DL
HCT VFR BLD CALC: 45.6 % (ref 37–47)
HEMOGLOBIN: 15.1 G/DL (ref 12–16)
HUMAN METAPNEUMOVIRUS BY PCR: NOT DETECTED
HUMAN RHINOVIRUS/ENTEROVIRUS BY PCR: NOT DETECTED
IMMATURE GRANULOCYTES #: 0.1 K/UL
INFLUENZA A BY PCR: NOT DETECTED
INFLUENZA B BY PCR: NOT DETECTED
KETONES, URINE: 15 MG/DL
LACTIC ACID: 1.6 MMOL/L (ref 0.5–1.9)
LEUKOCYTE ESTERASE, URINE: ABNORMAL
LYMPHOCYTES ABSOLUTE: 2.9 K/UL (ref 1.1–4.5)
LYMPHOCYTES RELATIVE PERCENT: 27.5 % (ref 20–40)
MCH RBC QN AUTO: 29.2 PG (ref 27–31)
MCHC RBC AUTO-ENTMCNC: 33.1 G/DL (ref 33–37)
MCV RBC AUTO: 88.2 FL (ref 81–99)
MONOCYTES ABSOLUTE: 0.6 K/UL (ref 0–0.9)
MONOCYTES RELATIVE PERCENT: 6.1 % (ref 0–10)
MYCOPLASMA PNEUMONIAE BY PCR: NOT DETECTED
NEUTROPHILS ABSOLUTE: 6.8 K/UL (ref 1.5–7.5)
NEUTROPHILS RELATIVE PERCENT: 64.1 % (ref 50–65)
NITRITE, URINE: NEGATIVE
PARAINFLUENZA VIRUS 1 BY PCR: NOT DETECTED
PARAINFLUENZA VIRUS 2 BY PCR: NOT DETECTED
PARAINFLUENZA VIRUS 3 BY PCR: NOT DETECTED
PARAINFLUENZA VIRUS 4 BY PCR: NOT DETECTED
PDW BLD-RTO: 13.9 % (ref 11.5–14.5)
PERFORMED ON: ABNORMAL
PH UA: 5 (ref 5–8)
PLATELET # BLD: 261 K/UL (ref 130–400)
PMV BLD AUTO: 10.5 FL (ref 9.4–12.3)
POTASSIUM SERPL-SCNC: 4 MMOL/L (ref 3.5–5)
PRO-BNP: 24 PG/ML (ref 0–450)
PROTEIN UA: ABNORMAL MG/DL
RBC # BLD: 5.17 M/UL (ref 4.2–5.4)
RESPIRATORY SYNCYTIAL VIRUS BY PCR: NOT DETECTED
SARS-COV-2, PCR: NOT DETECTED
SODIUM BLD-SCNC: 133 MMOL/L (ref 136–145)
SPECIFIC GRAVITY UA: 1.03 (ref 1–1.03)
TOTAL PROTEIN: 7.2 G/DL (ref 6.6–8.7)
TROPONIN: <0.01 NG/ML (ref 0–0.03)
UROBILINOGEN, URINE: 1 E.U./DL
WBC # BLD: 10.5 K/UL (ref 4.8–10.8)

## 2022-11-08 PROCEDURE — 84484 ASSAY OF TROPONIN QUANT: CPT

## 2022-11-08 PROCEDURE — 99285 EMERGENCY DEPT VISIT HI MDM: CPT

## 2022-11-08 PROCEDURE — 71045 X-RAY EXAM CHEST 1 VIEW: CPT | Performed by: RADIOLOGY

## 2022-11-08 PROCEDURE — 96375 TX/PRO/DX INJ NEW DRUG ADDON: CPT

## 2022-11-08 PROCEDURE — 83880 ASSAY OF NATRIURETIC PEPTIDE: CPT

## 2022-11-08 PROCEDURE — 36415 COLL VENOUS BLD VENIPUNCTURE: CPT

## 2022-11-08 PROCEDURE — 81001 URINALYSIS AUTO W/SCOPE: CPT

## 2022-11-08 PROCEDURE — 71045 X-RAY EXAM CHEST 1 VIEW: CPT

## 2022-11-08 PROCEDURE — 85025 COMPLETE CBC W/AUTO DIFF WBC: CPT

## 2022-11-08 PROCEDURE — 0202U NFCT DS 22 TRGT SARS-COV-2: CPT

## 2022-11-08 PROCEDURE — 96374 THER/PROPH/DIAG INJ IV PUSH: CPT

## 2022-11-08 PROCEDURE — 80053 COMPREHEN METABOLIC PANEL: CPT

## 2022-11-08 PROCEDURE — 83605 ASSAY OF LACTIC ACID: CPT

## 2022-11-08 PROCEDURE — 6360000002 HC RX W HCPCS: Performed by: EMERGENCY MEDICINE

## 2022-11-08 PROCEDURE — 82947 ASSAY GLUCOSE BLOOD QUANT: CPT

## 2022-11-08 RX ORDER — METOCLOPRAMIDE HYDROCHLORIDE 5 MG/ML
10 INJECTION INTRAMUSCULAR; INTRAVENOUS ONCE
Status: COMPLETED | OUTPATIENT
Start: 2022-11-08 | End: 2022-11-08

## 2022-11-08 RX ORDER — ONDANSETRON 4 MG/1
4 TABLET, ORALLY DISINTEGRATING ORAL EVERY 8 HOURS PRN
COMMUNITY
Start: 2018-08-18

## 2022-11-08 RX ORDER — ERENUMAB-AOOE 70 MG/ML
70 INJECTION SUBCUTANEOUS
COMMUNITY
Start: 2021-02-09

## 2022-11-08 RX ORDER — DIPHENHYDRAMINE HYDROCHLORIDE 50 MG/ML
25 INJECTION INTRAMUSCULAR; INTRAVENOUS ONCE
Status: COMPLETED | OUTPATIENT
Start: 2022-11-08 | End: 2022-11-08

## 2022-11-08 RX ORDER — LEVOTHYROXINE SODIUM 88 UG/1
88 TABLET ORAL DAILY
COMMUNITY
Start: 2022-02-14 | End: 2023-02-15

## 2022-11-08 RX ORDER — PROMETHAZINE HYDROCHLORIDE 25 MG/1
TABLET ORAL
COMMUNITY
Start: 2021-02-09

## 2022-11-08 RX ORDER — AMITRIPTYLINE HYDROCHLORIDE 50 MG/1
50 TABLET, FILM COATED ORAL DAILY
COMMUNITY
Start: 2020-03-06

## 2022-11-08 RX ORDER — SUMATRIPTAN 50 MG/1
50 TABLET, FILM COATED ORAL DAILY
COMMUNITY
Start: 2020-03-06

## 2022-11-08 RX ORDER — INSULIN LISPRO 100 [IU]/ML
INJECTION, SOLUTION INTRAVENOUS; SUBCUTANEOUS
COMMUNITY
Start: 2022-02-14

## 2022-11-08 RX ORDER — RIZATRIPTAN BENZOATE 10 MG/1
10 TABLET, ORALLY DISINTEGRATING ORAL
COMMUNITY
Start: 2021-02-09

## 2022-11-08 RX ORDER — ATORVASTATIN CALCIUM 40 MG/1
40 TABLET, FILM COATED ORAL DAILY
COMMUNITY

## 2022-11-08 RX ADMIN — DIPHENHYDRAMINE HYDROCHLORIDE 25 MG: 50 INJECTION, SOLUTION INTRAMUSCULAR; INTRAVENOUS at 20:39

## 2022-11-08 RX ADMIN — METOCLOPRAMIDE 10 MG: 5 INJECTION, SOLUTION INTRAMUSCULAR; INTRAVENOUS at 20:39

## 2022-11-08 ASSESSMENT — ENCOUNTER SYMPTOMS
NAUSEA: 1
SHORTNESS OF BREATH: 0
ABDOMINAL PAIN: 0
SORE THROAT: 0
SINUS PRESSURE: 0
RHINORRHEA: 0
VOMITING: 0
DIARRHEA: 0

## 2022-11-09 LAB
BACTERIA: ABNORMAL /HPF
CRYSTALS, UA: ABNORMAL /HPF
EPITHELIAL CELLS, UA: ABNORMAL /HPF
RBC UA: ABNORMAL /HPF (ref 0–2)
WBC UA: ABNORMAL /HPF (ref 0–5)

## 2022-11-09 NOTE — ED NOTES
Patient placed on cardiac monitor, continuous pulse oximeter, and NIBP monitor. Monitor alarms on.            Christina Isabel RN  11/08/22 7259

## 2022-11-09 NOTE — ED PROVIDER NOTES
[facility] EMERGENCY DEPT  eMERGENCYdEPARTMENT eNCOUnter      Pt Name: Kwan Silva  MRN: [id number]  [place] 1975  Date of evaluation: 11/8/2022  Lorraine Condon MD    Emergency Department care of this patient was assumed at 2200 from Dr. Ketan Kumar. We have discussed the case and the plan of care. I have seen and evaluated patient and reviewed ED course. CHIEF COMPLAINT       Chief Complaint   Patient presents with    Hypertension     150/102 in triage, states she has headache and hyperglycemia. PHYSICAL EXAM    (up to 7 for level 4, 8 or more for level 5)     ED Triage Vitals [11/08/22 1948]   BP Temp Temp Source Heart Rate Resp SpO2 Height Weight   125/89 97.9 °F (36.6 °C) Oral 82 18 96 % -- --       Physical Exam    DIAGNOSTIC RESULTS     EKG: All EKG's are interpreted by the Emergency Department Physician who either signs or Co-signs this chart inthe absence of a cardiologist.        RADIOLOGY:   Non-plain film imagessuch as CT, Ultrasound and MRI are read by the radiologist. Plain radiographic images are visualized and preliminarily interpreted by the emergency physician with the below findings:      XR CHEST PORTABLE   Final Result   The lungs are grossly clear; noevidence of acute infiltrate or pleural effusion   Recommendation: Follow up as clinically indicated.     Electronically Signed by David Krueger MD at 08-Nov-2022 10:21:12 PM EST                       LABS:  Labs Reviewed   COMPREHENSIVE METABOLIC PANEL - Abnormal; Notable for the following components:       Result Value    Sodium 133 (*)     Glucose 219 (*)     Creatinine 0.3 (*)     Alkaline Phosphatase 124 (*)     ALT 36 (*)     AST 46 (*)     All other components within normal limits   URINALYSIS WITH REFLEX TO CULTURE - Abnormal; Notable for the following components:    Color, UA DARK YELLOW (*)     Clarity, UA TURBID (*)     Glucose, Ur 250 (*)     Ketones, Urine 15 (*)     Protein, UA TRACE (*)     Leukocyte Esterase, Urine TRACE (*)     All other components within normal limits   MICROSCOPIC URINALYSIS - Abnormal; Notable for the following components:    Bacteria, UA 4+ (*)     Crystals, UA NEG (*)     All other components within normal limits   POCT GLUCOSE - Abnormal; Notable for the following components:    POC Glucose 205 (*)     All other components within normal limits   RESPIRATORY PANEL, MOLECULAR, WITH COVID-19   CBC WITH AUTO DIFFERENTIAL   LACTIC ACID   TROPONIN   BRAIN NATRIURETIC PEPTIDE       All other labs were within normal range or not returned as of this dictation. EMERGENCY DEPARTMENT COURSE and DIFFERENTIAL DIAGNOSIS/MDM:   Vitals:    Vitals:    11/08/22 1948   BP: 125/89   Pulse: 82   Resp: 18   Temp: 97.9 °F (36.6 °C)   TempSrc: Oral   SpO2: 96%       MDM      BP has normalized here. Lab work up reassuring. Following up resp panel and UA with plans for DC. Resp panel neg. Urine contaminated specimen will follow up culture. Pt stable for DC.    CONSULTS:  None    PROCEDURES:  Unless otherwise noted below, none     Procedures    FINAL IMPRESSION      1. Generalized weakness    2. Hypertension, unspecified type          DISPOSITION/PLAN   DISPOSITION Decision To Discharge    PATIENT REFERRED TO:  [facility]  [address] [place] [address].   [facility] [address]  [phone]        DISCHARGE MEDICATIONS:  Discharge Medication List as of 11/9/2022  1:05 AM             (Please note that portions ofthis note were completed with a voice recognition program.  Efforts were made to edit the dictations but occasionally words are mis-transcribed.)    Tahmina Estevez MD(electronically signed)  Attending Emergency Physician         Sha Meadows MD  11/09/22 9634

## 2022-11-09 NOTE — ED PROVIDER NOTES
[facility] EMERGENCY DEPT  eMERGENCY dEPARTMENT eNCOUnter      Pt Name: Quang Chirinos  MRN: [id number]  [place] 1975  Date of evaluation: 11/8/2022  Provider: Ortiz Ibarra MD    CHIEF COMPLAINT       Chief Complaint   Patient presents with    Hypertension     150/102 in triage, states she has headache and hyperglycemia. HISTORY OF PRESENT ILLNESS   (Location/Symptom, Timing/Onset,Context/Setting, Quality, Duration, Modifying Factors, Severity)  Note limiting factors. Quang Chirinos is a 41 y.o. female who presents to the emergency department multiple concerns tension and hyperglycemia. She also complains of a headache. HPI    The patient is a 28-year-old white female; with a history of hyperlipidemia; obesity; insulin-dependent diabetes; hypothyroidism; migraine headaches; feeling well for few days: Concern for headache; nausea; noted to have high readings on a blood pressure monitor at home despite not being on blood pressure medications; bouts of hyperglycemia; has not eaten much over the last 24 to 36 hours and reduced her insulin accordingly. No fever. Has felt weak. No chest pain or shortness of breath. No neck stiffness or photophobia. Has not had much to drink however and feels as though she may be dehydrated as well. Her blood pressure readings particularly concerned her. Blood pressure has been as high as 160/100. No vomiting. NursingNotes were reviewed. REVIEW OF SYSTEMS    (2-9 systems for level 4, 10 or more for level 5)     Review of Systems   Constitutional:  Negative for chills, diaphoresis, fatigue and fever. HENT:  Negative for rhinorrhea, sinus pressure and sore throat. Eyes:  Negative for visual disturbance. Respiratory:  Negative for shortness of breath. Cardiovascular:  Negative for chest pain. Gastrointestinal:  Positive for nausea. Negative for abdominal pain, diarrhea and vomiting.    Genitourinary:  Negative for difficulty urinating and dysuria. Musculoskeletal:  Negative for arthralgias and myalgias. Skin:  Negative for rash. Neurological:  Positive for weakness and headaches. Psychiatric/Behavioral:  Negative for confusion. All other systems reviewed and are negative. PAST MEDICALHISTORY   History reviewed. No pertinent past medical history. SURGICAL HISTORY     History reviewed. No pertinent surgical history. CURRENT MEDICATIONS     Discharge Medication List as of 11/9/2022  1:05 AM        CONTINUE these medications which have NOT CHANGED    Details   insulin glargine (LANTUS;BASAGLAR) 100 UNIT/ML injection pen 40  units qhsHistorical Med      levothyroxine (SYNTHROID) 88 MCG tablet Take 88 mcg by mouth dailyHistorical Med      insulin lispro, 1 Unit Dial, (HUMALOG/ADMELOG) 100 UNIT/ML SOPN 6 up to 25 units with each meal TIDHistorical Med      empagliflozin (JARDIANCE) 25 MG tablet Take 25 mg by mouth dailyHistorical Med      amitriptyline (ELAVIL) 50 MG tablet Take 50 mg by mouth dailyHistorical Med      promethazine (PHENERGAN) 25 MG tablet Take one tablet for nausea with migraine may repeat in six hours. Max 2 tabs in 24 hours. Historical Med      ondansetron (ZOFRAN-ODT) 4 MG disintegrating tablet Take 4 mg by mouth every 8 hours as neededHistorical Med      rizatriptan (MAXALT-MLT) 10 MG disintegrating tablet Place 10 mg under the tongue once as neededHistorical Med      SUMAtriptan (IMITREX) 50 MG tablet Take 50 mg by mouth dailyHistorical Med      Erenumab-aooe (AIMOVIG) 70 MG/ML SOAJ Inject 70 mg into the skin every 30 daysHistorical Med      atorvastatin (LIPITOR) 40 MG tablet Take 40 mg by mouth dailyHistorical Med             ALLERGIES     Caffeine    FAMILY HISTORY     History reviewed. No pertinent family history.        SOCIAL HISTORY       Social History     Socioeconomic History    Marital status:      Spouse name: None    Number of children: None    Years of education: None    Highest education level: None       SCREENINGS    Eric Coma Scale  Eye Opening: Spontaneous  Best Verbal Response: Oriented  Best Motor Response: Obeys commands  Sor[place]o Coma Scale Score: 15        PHYSICAL EXAM    (up to 7 for level 4, 8 or more for level 5)     ED Triage Vitals [11/08/22 1948]   BP Temp Temp Source Heart Rate Resp SpO2 Height Weight   125/89 97.9 °F (36.6 °C) Oral 82 18 96 % -- --       Physical Exam  Vitals and nursing note reviewed. Constitutional:       Appearance: She is well-developed. HENT:      Head: Normocephalic and atraumatic. Nose: Nose normal.      Mouth/Throat:      Mouth: Mucous membranes are moist.   Eyes:      General:         Right eye: No discharge. Left eye: No discharge. Conjunctiva/sclera: Conjunctivae normal.      Pupils: Pupils are equal, round, and reactive to light. Cardiovascular:      Rate and Rhythm: Normal rate and regular rhythm. Heart sounds: Normal heart sounds. Pulmonary:      Effort: Pulmonary effort is normal. No respiratory distress. Breath sounds: Normal breath sounds. No wheezing or rales. Abdominal:      General: Bowel sounds are normal.      Palpations: Abdomen is soft. There is no mass. Tenderness: There is no abdominal tenderness. There is no guarding or rebound. Musculoskeletal:         General: No tenderness. Normal range of motion. Cervical back: Normal range of motion and neck supple. Skin:     General: Skin is warm and dry. Capillary Refill: Capillary refill takes less than 2 seconds. Neurological:      Mental Status: She is alert and oriented to person, place, and time. Psychiatric:         Behavior: Behavior normal.         Thought Content:  Thought content normal.         Judgment: Judgment normal.     DIAGNOSTIC RESULTS     EKG: All EKG's areinterpreted by the Emergency Department Physician who either signs or Co-signs this chart in the absence of a cardiologist.        RADIOLOGY:  Non-plain film images such as CT, Ultrasound and MRI are read by the radiologist. Plain radiographic images are visualized and preliminarily interpreted bythe emergency physician with the below findings:          XR CHEST PORTABLE   Final Result   The lungs are grossly clear; noevidence of acute infiltrate or pleural effusion   Recommendation: Follow up as clinically indicated. Electronically Signed by Christina Flores MD at 08-Nov-2022 10:21:12 PM EST                       LABS:  Labs Reviewed   COMPREHENSIVE METABOLIC PANEL - Abnormal; Notable for the following components:       Result Value    Sodium 133 (*)     Glucose 219 (*)     Creatinine 0.3 (*)     Alkaline Phosphatase 124 (*)     ALT 36 (*)     AST 46 (*)     All other components within normal limits   URINALYSIS WITH REFLEX TO CULTURE - Abnormal; Notable for the following components:    Color, UA DARK YELLOW (*)     Clarity, UA TURBID (*)     Glucose, Ur 250 (*)     Ketones, Urine 15 (*)     Protein, UA TRACE (*)     Leukocyte Esterase, Urine TRACE (*)     All other components within normal limits   MICROSCOPIC URINALYSIS - Abnormal; Notable for the following components:    Bacteria, UA 4+ (*)     Crystals, UA NEG (*)     All other components within normal limits   POCT GLUCOSE - Abnormal; Notable for the following components:    POC Glucose 205 (*)     All other components within normal limits   RESPIRATORY PANEL, MOLECULAR, WITH COVID-19   CBC WITH AUTO DIFFERENTIAL   LACTIC ACID   TROPONIN   BRAIN NATRIURETIC PEPTIDE       All other labs were within normal range or not returned as of this dictation.     EMERGENCY DEPARTMENT COURSE and DIFFERENTIAL DIAGNOSIS/MDM:   Vitals:    Vitals:    11/08/22 1948   BP: 125/89   Pulse: 82   Resp: 18   Temp: 97.9 °F (36.6 °C)   TempSrc: Oral   SpO2: 96%       MDM    Headache treated in the ED with migraine cocktail; symptoms improved; urine analysis pending; endorsed to Dr. Bryan Vela follow up with diagnostic studies. Reassessment      CONSULTS:  None    PROCEDURES:  Unless otherwise noted below, none     Procedures    FINAL IMPRESSION      1. Generalized weakness    2. Hypertension, unspecified type          DISPOSITION/PLAN   DISPOSITION Decision To Discharge 11/09/2022 12:05:31 AM      PATIENT REFERRED TO:  [facility] PRIMARY [facility]ARE  [id number] [address]  [address] [place] [address]   [address] [facility].   [facility] [address] [phone]        DISCHARGE MEDICATIONS:  Discharge Medication List as of 11/9/2022  1:05 AM             (Please note that portions of this note were completed with a voice recognition program.  Efforts were made to edit thedictations but occasionally words are mis-transcribed.)    Chelsi Rucker MD (electronically signed)  Attending Emergency Physician          Chelsi Rucker MD  11/15/22 0371

## 2023-05-22 ENCOUNTER — TELEPHONE (OUTPATIENT)
Dept: ENDOCRINOLOGY | Facility: CLINIC | Age: 48
End: 2023-05-22
Payer: COMMERCIAL

## 2023-05-22 DIAGNOSIS — E11.65 TYPE 2 DIABETES MELLITUS WITH HYPERGLYCEMIA, WITH LONG-TERM CURRENT USE OF INSULIN: Primary | ICD-10-CM

## 2023-05-22 DIAGNOSIS — Z79.4 TYPE 2 DIABETES MELLITUS WITH HYPERGLYCEMIA, WITH LONG-TERM CURRENT USE OF INSULIN: Primary | ICD-10-CM

## 2023-05-22 RX ORDER — INSULIN GLARGINE 100 [IU]/ML
INJECTION, SOLUTION SUBCUTANEOUS
Qty: 15 ML | Refills: 3 | Status: SHIPPED | OUTPATIENT
Start: 2023-05-22 | End: 2023-05-22

## 2023-05-22 RX ORDER — INSULIN GLARGINE 100 [IU]/ML
INJECTION, SOLUTION SUBCUTANEOUS
Qty: 15 ML | Refills: 3 | Status: SHIPPED | OUTPATIENT
Start: 2023-05-22

## 2023-05-22 RX ORDER — BLOOD SUGAR DIAGNOSTIC
STRIP MISCELLANEOUS
Qty: 120 EACH | Refills: 11 | Status: SHIPPED | OUTPATIENT
Start: 2023-05-22

## 2023-05-22 RX ORDER — INSULIN LISPRO 100 [IU]/ML
INJECTION, SOLUTION INTRAVENOUS; SUBCUTANEOUS
Qty: 30 ML | Refills: 3 | Status: SHIPPED | OUTPATIENT
Start: 2023-05-22 | End: 2023-05-22

## 2023-05-22 RX ORDER — BLOOD SUGAR DIAGNOSTIC
STRIP MISCELLANEOUS
Qty: 120 EACH | Refills: 11 | Status: SHIPPED | OUTPATIENT
Start: 2023-05-22 | End: 2023-05-22

## 2023-05-22 RX ORDER — INSULIN LISPRO 100 [IU]/ML
INJECTION, SOLUTION INTRAVENOUS; SUBCUTANEOUS
Qty: 30 ML | Refills: 3 | Status: SHIPPED | OUTPATIENT
Start: 2023-05-22

## 2023-05-22 NOTE — TELEPHONE ENCOUNTER
Pt has had enough sent in to get them to appointment will not get another refill if they miss the appointment

## 2023-05-22 NOTE — TELEPHONE ENCOUNTER
Pt called stating she needs refills but also needs to change to a different pharmacy. Pt asked for refills of Basaglar, HumaLOG, and needles be sent to [facility] in [place] at [address] [place] [address].    Please advise    Thank you

## 2023-05-23 ENCOUNTER — DOCUMENTATION (OUTPATIENT)
Dept: ENDOCRINOLOGY | Facility: CLINIC | Age: 48
End: 2023-05-23
Payer: COMMERCIAL

## 2023-05-23 NOTE — PROGRESS NOTES
PA FOR BASAGLAR KWIKPEN SUBMITTED VIA [facility] [employer].    BASAGLAR APPROVED FROM 05/23/2023 to 05/22/2024

## 2023-08-30 ENCOUNTER — TELEMEDICINE (OUTPATIENT)
Dept: ENDOCRINOLOGY | Facility: CLINIC | Age: 48
End: 2023-08-30
Payer: COMMERCIAL

## 2023-08-30 DIAGNOSIS — E11.65 TYPE 2 DIABETES MELLITUS WITH HYPERGLYCEMIA, WITH LONG-TERM CURRENT USE OF INSULIN: Primary | ICD-10-CM

## 2023-08-30 DIAGNOSIS — E55.9 VITAMIN D DEFICIENCY: ICD-10-CM

## 2023-08-30 DIAGNOSIS — Z79.4 TYPE 2 DIABETES MELLITUS WITH HYPERGLYCEMIA, WITH LONG-TERM CURRENT USE OF INSULIN: Primary | ICD-10-CM

## 2023-08-30 DIAGNOSIS — E03.9 ACQUIRED HYPOTHYROIDISM: ICD-10-CM

## 2023-08-30 DIAGNOSIS — E11.43 DIABETIC AUTONOMIC NEUROPATHY ASSOCIATED WITH TYPE 2 DIABETES MELLITUS: ICD-10-CM

## 2023-08-30 RX ORDER — BLOOD-GLUCOSE METER
KIT MISCELLANEOUS
Qty: 200 EACH | Refills: 11 | Status: SHIPPED | OUTPATIENT
Start: 2023-08-30

## 2023-08-30 NOTE — PROGRESS NOTES
Chief Complaint  Diabetes    Subjective          Diane Esquivel presents to [facility] ENDOCRINOLOGY  History of Present Illness       You have chosen to receive care through a telehealth visit.  Do you consent to use a video/audio connection for your medical care today? Yes      Mode of Visit: Video  Location of patient: home  You have chosen to receive care through a telehealth visit.  Does the patient consent to use a video/audio connection for your medical care today? Yes  The visit included audio and video interaction. No technical issues occurred during this visit.      Primary care provider      Lisa Veras MD      47 year old female presents for follow up     Diabetes mellitus type 2    Duration 2010         Timing constant    Quality not controlled    Severity is moderate      Complications neuropathy              Current diabetes regimen      Insulin, oral medications      Could not tolerate GLP-1      Current monitoring regimen      Home blood test     Check 4 times daily      States variable from 100 up to 250       Review of Systems - General ROS: negative                 Objective   Vital Signs:   There were no vitals taken for this visit.    Physical Exam  Neurological:      General: No focal deficit present.      Mental Status: She is alert.   Psychiatric:         Mood and Affect: Mood normal.         Thought Content: Thought content normal.         Judgment: Judgment normal.      Result Review :   The following data was reviewed by: YESICA Villanueva on 02/14/2022:  Common labs          11/8/2022    20:34   Common Labs   WBC 10.5       Hemoglobin 15.1       Hematocrit 45.6       Platelets 261          Details          This result is from an external source.                       Assessment and Plan    Diagnoses and all orders for this visit:    1. Type 2 diabetes mellitus with hyperglycemia, with long-term current use of insulin (Primary)    2. Acquired  hypothyroidism    3. Vitamin D deficiency    4. Diabetic autonomic neuropathy associated with type 2 diabetes mellitus    Other orders  -     glucose blood test strip; Test 4 times daily  Dispense: 200 each; Refill: 11  -     B-D ULTRA-FINE 33 LANCETS misc; Use 4 x daily , any brand covered by insurance  Dispense: 200 each; Refill: 11             Glycemic Management:       diabetes mellitus type 2 with hyperglycemia        Lab Results   Component Value Date    HGBA1C 9.70 (H) 02/14/2022                Taking Basaglar  38  units once daily          taking Januvia 100 mg one daily          trulicity 0.75 mg weekly  --stop due to nausea                Synjardy makes sick had to stop          Actos 15 mg daily due to fatty liver --stop      Watch fluid accumulation         Jardiance taking 25 mg one daily         Humalog before each meal         Taking 12 units up to 16  units TID before each meal--   increase up to 20 units      Blood sugar              Insulin      200-250                    2 units  251-300                    3 units  301-350                    4 units   Above 351                5 units         Stop glipizide        Lipid Management             Taking lipitor 40 mg qhs           Blood Pressure Management:       At goal       not on ACEi              Microvascular Complication Monitoring:            Last Microalbumin-Proteinuria Assessment                     Lab Results   Component Value Date     MICROALBUR 9.5 05/28/2020            Neuropathy     Taking elavil 50 mg --no longer taking                  Preventive Care:      Nonsmoker                     Bone Health           Component      Latest Ref Rng & Units 5/28/2020   25 Hydroxy, Vitamin D      30.0 - 100.0 ng/ml 25.7 (L)          taking vitamin d supplement -- keep            Thyroid Health   hypothyroidism      Taking levothyroxine 88 mcg daily            Lab Results   Component Value Date     TSH 3.940 08/11/2021               Other Diabetes  Related Aspects               Lab Results   Component Value Date     [id number] 08/11/2021             diagnosed with sleep apnea      using CPAP                              Follow Up   No follow-ups on file.  Patient was given instructions and counseling regarding her condition or for health maintenance advice. Please see specific information pulled into the AVS if appropriate.         This document has been electronically signed by YESICA Villanueva on August 30, 2023 08:31 CDT.

## 2023-08-31 NOTE — PROGRESS NOTES
"  Subjective    Diane Esquivel is a 44 y.o. female. she is here today for follow-up.    History of Present Illness     IN OFFICE VISIT       Referring provider     Primary Care Provider     Mushtaq Mar MD     Reason - diabetes      Duration 10 years     Timing - Diabetes is Constant     Quality - not controlled      Severity -  moderate     Complications - none     Current symptoms/problems  none      Alleviating Factors: Compliance       Side Effects  none     Current diet  in general, a \"healthy\" diet       Current exercise none     Current monitoring regimen: home blood tests - checking 2 x daily         Lab Results   Component Value Date    HGBA1C 9.34 (H) 05/28/2020             Home blood sugar records:      This am 182     States improving     Now under 200          Hypoglycemia none         The following portions of the patient's history were reviewed and updated as appropriate:   Past Medical History:   Diagnosis Date   • Abnormal liver enzymes    • Acute serous otitis media    • Adult body mass index 30+     obesity   • Dysfunctional uterine bleeding    • Fatty liver    • Hypercholesteremia    • Hypertension associated with diabetes (CMS/[facility]) 2/4/2020   • Hypothyroidism due to Hashimoto's thyroiditis 2/4/2020   • Migraine    • Migraine     tension type headache vs migraine      • Mixed diabetic hyperlipidemia associated with type 2 diabetes mellitus (CMS/HCC) 2/4/2020   • Morbid (severe) obesity due to excess calories (CMS/[facility])     BMI 41   • Nausea, vomiting and diarrhea    • Non-alcoholic fatty liver disease    • Type 2 diabetes mellitus (CMS/[facility])     A1C 7.4   • Type 2 diabetes mellitus with hyperglycemia, with long-term current use of insulin (CMS/[facility]) 2/4/2020     Past Surgical History:   Procedure Laterality Date   • CHOLECYSTECTOMY     • INJECTION OF MEDICATION  02/18/2014    Phenergan (2)      • INJECTION OF MEDICATION  02/24/2014    Toradol (2)    • INJECTION OF MEDICATION  03/09/2014    " Zofran (2)        History reviewed. No pertinent family history.  OB History    None       Current Outpatient Medications   Medication Sig Dispense Refill   • atorvastatin (LIPITOR) 40 MG tablet Take 40 mg by mouth Daily.     • Insulin Glargine (BASAGLAR KWIKPEN) 100 UNIT/ML injection pen 30  units qhs 3 pen 11   • Insulin Pen Needle (Advocate Insulin Pen Needles) 31G X 8 MM misc Inject 4 times daily 120 each 11   • levothyroxine (SYNTHROID, LEVOTHROID) 50 MCG tablet 75 mcg.     • ondansetron ODT (ZOFRAN-ODT) 4 MG disintegrating tablet Take 1 tablet by mouth Every 8 (Eight) Hours As Needed for Nausea or Vomiting for up to 8 doses. 8 tablet 0   • ONE TOUCH ULTRA TEST test strip      • pioglitazone (ACTOS) 15 MG tablet Take 1 tablet by mouth Daily. 30 tablet 11   • SITagliptin (Januvia) 100 MG tablet 100 mg by mouth  daily before breakfast 30 tablet 11   • SUMAtriptan (IMITREX) 50 MG tablet Take 50 mg by mouth Daily.     • vitamin E 400 UNIT capsule Take 400 Units by mouth Daily.     • amitriptyline (ELAVIL) 50 MG tablet Take 50 mg by mouth Daily.     • Insulin Lispro, 1 Unit Dial, (HumaLOG KwikPen) 100 UNIT/ML solution pen-injector 6 up to 15 units with each meal TID 4 pen 11     No current facility-administered medications for this visit.      Allergies   Allergen Reactions   • Caffeine      Social History     Socioeconomic History   • Marital status:      Spouse name: Not on file   • Number of children: Not on file   • Years of education: Not on file   • Highest education level: Not on file   Tobacco Use   • Smoking status: Never Smoker   • Smokeless tobacco: Never Used   Substance and Sexual Activity   • Alcohol use: No   • Drug use: No   • Sexual activity: Defer       Review of Systems  Review of Systems   Constitutional: Negative for activity change, appetite change, diaphoresis and fatigue.   HENT: Negative for facial swelling, sneezing, sore throat, tinnitus, trouble swallowing and voice change.   "  Eyes: Negative for photophobia, pain, discharge, redness, itching and visual disturbance.   Respiratory: Negative for apnea, cough, choking, chest tightness and shortness of breath.    Cardiovascular: Negative for chest pain, palpitations and leg swelling.   Gastrointestinal: Negative for abdominal distention, abdominal pain, constipation, diarrhea, nausea and vomiting.   Endocrine: Negative for cold intolerance, heat intolerance, polydipsia, polyphagia and polyuria.   Genitourinary: Negative for difficulty urinating, dysuria, frequency, hematuria and urgency.   Musculoskeletal: Negative for arthralgias, back pain, gait problem, joint swelling, myalgias, neck pain and neck stiffness.   Skin: Negative for color change, pallor, rash and wound.   Neurological: Negative for dizziness, tremors, weakness, light-headedness, numbness and headaches.   Hematological: Negative for adenopathy. Does not bruise/bleed easily.   Psychiatric/Behavioral: Negative for behavioral problems, confusion and sleep disturbance.        Objective    /88   Pulse 87   Ht 167.6 cm (66\")   Wt 117 kg (257 lb 8 oz)   SpO2 98%   BMI 41.56 kg/m²   Physical Exam   Constitutional: She is oriented to person, place, and time. She appears well-developed and well-nourished. No distress.   HENT:   Head: Normocephalic and atraumatic.   Right Ear: External ear normal.   Left Ear: External ear normal.   Nose: Nose normal.   Eyes: Pupils are equal, round, and reactive to light. Conjunctivae and EOM are normal.   Neck: Normal range of motion. Neck supple. No tracheal deviation present. No thyromegaly present.   Cardiovascular: Normal rate, regular rhythm and normal heart sounds.   No murmur heard.  Pulmonary/Chest: Effort normal and breath sounds normal. No respiratory distress. She has no wheezes.   Abdominal: Soft. Bowel sounds are normal. There is no tenderness. There is no rebound and no guarding.   Musculoskeletal: Normal range of motion. She " Feliz Smith  Cardiovascular Disease  [address] [place] [address], [place] [address]  Phone: [phone]  Fax: [phone]  Follow Up Time: 2 weeks   exhibits no edema, tenderness or deformity.   Neurological: She is alert and oriented to person, place, and time. No cranial nerve deficit.   Skin: Skin is warm and dry. No rash noted.   Psychiatric: She has a normal mood and affect. Her behavior is normal. Judgment and thought content normal.       Lab Review  Glucose   Date Value   05/28/2020 243 mg/dL (H)   03/15/2017 169 mg/dL (H)   01/11/2016 181 mg/dl (H)   11/30/2015 127 mg/dl (H)     Sodium (mmol/L)   Date Value   05/28/2020 135 (L)   03/15/2017 133 (L)   01/11/2016 136 (L)   11/30/2015 137     Potassium (mmol/L)   Date Value   05/28/2020 4.5   03/15/2017 4.2   01/11/2016 4.2   11/30/2015 3.8     Chloride (mmol/L)   Date Value   05/28/2020 102   03/15/2017 99   01/11/2016 101   11/30/2015 99     CO2 (mmol/L)   Date Value   05/28/2020 21.7 (L)   03/15/2017 23.0   01/11/2016 23   11/30/2015 25     BUN   Date Value   05/28/2020 11 mg/dL   03/15/2017 10 mg/dL   01/11/2016 14 mg/dl   11/30/2015 12 mg/dl     Creatinine   Date Value   05/28/2020 0.62 mg/dL   03/15/2017 0.39 mg/dL (L)   01/11/2016 0.4 mg/dl (L)   11/30/2015 0.5 mg/dl     Hemoglobin A1C   Date Value   05/28/2020 9.34 % (H)   09/17/2019 8.9   01/11/2016 7.4 %TotHgb (H)     Triglycerides   Date Value   05/28/2020 211 mg/dL (H)   01/11/2016 121 mg/dl     LDL Cholesterol    Date Value   05/28/2020 120 mg/dL (H)   01/11/2016 155 mg/dl (H)       Assessment/Plan      1. Type 2 diabetes mellitus with hyperglycemia, with long-term current use of insulin (CMS/[facility])    2. Hypertension associated with diabetes (CMS/[facility])    3. Mixed diabetic hyperlipidemia associated with type 2 diabetes mellitus (CMS/[facility])    4. Vitamin D deficiency    .    Medications prescribed:  Outpatient Encounter Medications as of 7/28/2020   Medication Sig Dispense Refill   • atorvastatin (LIPITOR) 40 MG tablet Take 40 mg by mouth Daily.     • Insulin Glargine (BASAGLAR KWIKPEN) 100 UNIT/ML injection pen 30  units qhs 3 pen 11   • Insulin  Pen Needle (Advocate Insulin Pen Needles) 31G X 8 MM misc Inject 4 times daily 120 each 11   • levothyroxine (SYNTHROID, LEVOTHROID) 50 MCG tablet 75 mcg.     • ondansetron ODT (ZOFRAN-ODT) 4 MG disintegrating tablet Take 1 tablet by mouth Every 8 (Eight) Hours As Needed for Nausea or Vomiting for up to 8 doses. 8 tablet 0   • ONE TOUCH ULTRA TEST test strip      • pioglitazone (ACTOS) 15 MG tablet Take 1 tablet by mouth Daily. 30 tablet 11   • SITagliptin (Januvia) 100 MG tablet 100 mg by mouth  daily before breakfast 30 tablet 11   • SUMAtriptan (IMITREX) 50 MG tablet Take 50 mg by mouth Daily.     • vitamin E 400 UNIT capsule Take 400 Units by mouth Daily.     • [DISCONTINUED] Insulin Glargine (BASAGLAR KWIKPEN) 100 UNIT/ML injection pen 20 units qhs 2 pen 11   • amitriptyline (ELAVIL) 50 MG tablet Take 50 mg by mouth Daily.     • Insulin Lispro, 1 Unit Dial, (HumaLOG KwikPen) 100 UNIT/ML solution pen-injector 6 up to 15 units with each meal TID 4 pen 11   • [DISCONTINUED] Dulaglutide (TRULICITY) 0.75 MG/0.5ML solution pen-injector Inject 0.75 mg under the skin into the appropriate area as directed Every 7 (Seven) Days. 4 pen 11   • [DISCONTINUED] Empagliflozin-metFORMIN HCl ER (SYNJARDY XR) 5-1000 MG tablet sustained-release 24 hour Take 2 tablet/day by mouth Daily With Breakfast. 60 tablet 11     No facility-administered encounter medications on file as of 7/28/2020.        Orders placed during this encounter include:  Orders Placed This Encounter   Procedures   • Comprehensive Metabolic Panel     Standing Status:   Future     Standing Expiration Date:   7/28/2021   • Hemoglobin A1c     Standing Status:   Future     Standing Expiration Date:   7/28/2021   • Lipid Panel     Standing Status:   Future     Standing Expiration Date:   7/28/2021   • Vitamin D 25 Hydroxy     Standing Status:   Future     Standing Expiration Date:   7/28/2021   • Vitamin B12     Standing Status:   Future     Standing Expiration Date:    7/28/2021   • TSH     Standing Status:   Future     Standing Expiration Date:   7/28/2021   • Protein / Creatinine Ratio, Urine - Urine, Clean Catch     Standing Status:   Future     Standing Expiration Date:   7/28/2021   • Microalbumin / Creatinine Urine Ratio - Urine, Clean Catch     Standing Status:   Future     Standing Expiration Date:   7/28/2021   • CBC & Differential     Standing Status:   Future     Standing Expiration Date:   7/28/2021     Order Specific Question:   Manual Differential     Answer:   No     Pt has type 2 diabetes with hyperglycemia        Glycemic Management:            Lab Results   Component Value Date    HGBA1C 9.34 (H) 05/28/2020                         basaglar taking  24 units ---increased to 28 units             trulicity 0.75 mg weekly  , consider increasing but nauseous , has migraines that aggravates nausea----stop due to nausea      Januvia 100 mg one daily                   Synjardy makes sick had to stop          Actos 15 mg daily due to fatty liver      Watch fluid accumulation         Jardiance 10 mg once daily         Humalog before each meal          6 units before each meal --     Blood sugar              Insulin      200-250                    2 units  251-300                    3 units  301-350                    4 units   Above 351                5 units         Stop glipizide        Lipid Management      Total Cholesterol   Date Value Ref Range Status   05/28/2020 195 0 - 200 mg/dL Final     Triglycerides   Date Value Ref Range Status   05/28/2020 211 (H) 0 - 150 mg/dL Final     HDL Cholesterol   Date Value Ref Range Status   05/28/2020 33 (L) 40 - 60 mg/dL Final     LDL Cholesterol    Date Value Ref Range Status   05/28/2020 120 (H) 0 - 100 mg/dL Final           lipitor 40 mg qhs        Blood Pressure Management:       At goal               Microvascular Complication Monitoring:       Eye Exam Evaluation----over due -- will schedule on her own      -----------      Last Microalbumin-Proteinuria Assessment           Lab Results   Component Value Date    MICROALBUR 9.5 05/28/2020           Neuropathy        Immunizations:             Preventive Care:          Weight Related:      Patient's Body mass index is 41.56 kg/m². BMI is above normal parameters. Recommendations include: nutrition counseling.                   Bone Health          Component      Latest Ref Rng & Units 5/28/2020   25 Hydroxy, Vitamin D      30.0 - 100.0 ng/ml 25.7 (L)              Thyroid Health     \              On levothyroxine 100     Lab Results   Component Value Date    TSH 4.900 (H) 05/28/2020                         Other Diabetes Related Aspects         Lab Results   Component Value Date    [id number] 743 05/28/2020           Scheduled for sleep study next month                   4. Follow-up: No follow-ups on file.

## 2023-10-19 ENCOUNTER — HOSPITAL ENCOUNTER (EMERGENCY)
Age: 48
Discharge: HOME | End: 2023-10-19
Attending: EMERGENCY MEDICINE
Payer: COMMERCIAL

## 2023-10-19 ENCOUNTER — APPOINTMENT (OUTPATIENT)
Dept: GENERAL RADIOLOGY | Age: 48
End: 2023-10-19
Payer: COMMERCIAL

## 2023-10-19 VITALS
RESPIRATION RATE: 20 BRPM | HEART RATE: 90 BPM | OXYGEN SATURATION: 95 % | WEIGHT: 231 LBS | DIASTOLIC BLOOD PRESSURE: 84 MMHG | SYSTOLIC BLOOD PRESSURE: 135 MMHG | TEMPERATURE: 98.2 F

## 2023-10-19 DIAGNOSIS — R07.89 ATYPICAL CHEST PAIN: Primary | ICD-10-CM

## 2023-10-19 LAB
ALBUMIN SERPL-MCNC: 3.8 G/DL (ref 3.5–5.2)
ALP SERPL-CCNC: 127 U/L (ref 35–104)
ALT SERPL-CCNC: 21 U/L (ref 5–33)
ANION GAP SERPL CALCULATED.3IONS-SCNC: 13 MMOL/L (ref 7–19)
AST SERPL-CCNC: 22 U/L (ref 5–32)
BASOPHILS # BLD: 0.1 K/UL (ref 0–0.2)
BASOPHILS NFR BLD: 0.7 % (ref 0–1)
BILIRUB SERPL-MCNC: 0.3 MG/DL (ref 0.2–1.2)
BILIRUB UR QL STRIP: NEGATIVE
BUN SERPL-MCNC: 10 MG/DL (ref 6–20)
CALCIUM SERPL-MCNC: 8.9 MG/DL (ref 8.6–10)
CHLORIDE SERPL-SCNC: 101 MMOL/L (ref 98–111)
CLARITY UR: CLEAR
CO2 SERPL-SCNC: 21 MMOL/L (ref 22–29)
COLOR UR: YELLOW
CREAT SERPL-MCNC: 0.4 MG/DL (ref 0.5–0.9)
EOSINOPHIL # BLD: 0.2 K/UL (ref 0–0.6)
EOSINOPHIL NFR BLD: 1.7 % (ref 0–5)
ERYTHROCYTE [DISTWIDTH] IN BLOOD BY AUTOMATED COUNT: 14.3 % (ref 11.5–14.5)
GLUCOSE SERPL-MCNC: 317 MG/DL (ref 74–109)
GLUCOSE UR STRIP.AUTO-MCNC: =>1000 MG/DL
HCG SERPL QL: NEGATIVE
HCT VFR BLD AUTO: 45.9 % (ref 37–47)
HGB BLD-MCNC: 15.3 G/DL (ref 12–16)
HGB UR STRIP.AUTO-MCNC: NEGATIVE MG/L
IMM GRANULOCYTES # BLD: 0 K/UL
KETONES UR STRIP.AUTO-MCNC: ABNORMAL MG/DL
LEUKOCYTE ESTERASE UR QL STRIP.AUTO: NEGATIVE
LYMPHOCYTES # BLD: 2.8 K/UL (ref 1.1–4.5)
LYMPHOCYTES NFR BLD: 32.2 % (ref 20–40)
MCH RBC QN AUTO: 29.6 PG (ref 27–31)
MCHC RBC AUTO-ENTMCNC: 33.3 G/DL (ref 33–37)
MCV RBC AUTO: 88.8 FL (ref 81–99)
MONOCYTES # BLD: 0.6 K/UL (ref 0–0.9)
MONOCYTES NFR BLD: 7.2 % (ref 0–10)
NEUTROPHILS # BLD: 5 K/UL (ref 1.5–7.5)
NEUTS SEG NFR BLD: 57.7 % (ref 50–65)
NITRITE UR QL STRIP.AUTO: NEGATIVE
PH UR STRIP.AUTO: 5.5 [PH] (ref 5–8)
PLATELET # BLD AUTO: 280 K/UL (ref 130–400)
PMV BLD AUTO: 10.4 FL (ref 9.4–12.3)
POTASSIUM SERPL-SCNC: 4.3 MMOL/L (ref 3.5–5)
PROT SERPL-MCNC: 7.3 G/DL (ref 6.6–8.7)
PROT UR STRIP.AUTO-MCNC: ABNORMAL MG/DL
RBC # BLD AUTO: 5.17 M/UL (ref 4.2–5.4)
SODIUM SERPL-SCNC: 135 MMOL/L (ref 136–145)
SP GR UR STRIP.AUTO: >=1.045 (ref 1–1.03)
TROPONIN T SERPL-MCNC: <0.01 NG/ML (ref 0–0.03)
TROPONIN T SERPL-MCNC: <0.01 NG/ML (ref 0–0.03)
UROBILINOGEN UR STRIP.AUTO-MCNC: 0.2 E.U./DL
WBC # BLD AUTO: 8.6 K/UL (ref 4.8–10.8)

## 2023-10-19 PROCEDURE — 84703 CHORIONIC GONADOTROPIN ASSAY: CPT

## 2023-10-19 PROCEDURE — 99285 EMERGENCY DEPT VISIT HI MDM: CPT

## 2023-10-19 PROCEDURE — 85025 COMPLETE CBC W/AUTO DIFF WBC: CPT

## 2023-10-19 PROCEDURE — 81003 URINALYSIS AUTO W/O SCOPE: CPT

## 2023-10-19 PROCEDURE — 84484 ASSAY OF TROPONIN QUANT: CPT

## 2023-10-19 PROCEDURE — 71045 X-RAY EXAM CHEST 1 VIEW: CPT

## 2023-10-19 PROCEDURE — 80053 COMPREHEN METABOLIC PANEL: CPT

## 2023-10-19 PROCEDURE — 36415 COLL VENOUS BLD VENIPUNCTURE: CPT

## 2023-10-19 RX ORDER — RIMEGEPANT SULFATE 75 MG/75MG
TABLET, ORALLY DISINTEGRATING ORAL
COMMUNITY

## 2023-10-19 RX ORDER — VITAMIN E 268 MG
400 CAPSULE ORAL DAILY
COMMUNITY

## 2023-10-19 ASSESSMENT — PAIN DESCRIPTION - LOCATION: LOCATION: CHEST

## 2023-10-19 ASSESSMENT — PAIN DESCRIPTION - DESCRIPTORS: DESCRIPTORS: STABBING

## 2023-10-19 ASSESSMENT — ENCOUNTER SYMPTOMS
NAUSEA: 0
DIARRHEA: 0
ABDOMINAL PAIN: 0
SHORTNESS OF BREATH: 1
VOMITING: 0
ABDOMINAL DISTENTION: 0

## 2023-10-19 ASSESSMENT — PAIN - FUNCTIONAL ASSESSMENT: PAIN_FUNCTIONAL_ASSESSMENT: 0-10

## 2023-10-19 ASSESSMENT — PAIN SCALES - GENERAL: PAINLEVEL_OUTOF10: 8

## 2023-10-19 ASSESSMENT — PAIN DESCRIPTION - ORIENTATION: ORIENTATION: MID;LEFT

## 2023-10-23 LAB
EKG P AXIS: 38 DEGREES
EKG P-R INTERVAL: 132 MS
EKG Q-T INTERVAL: 342 MS
EKG QRS DURATION: 80 MS
EKG QTC CALCULATION (BAZETT): 412 MS
EKG T AXIS: 43 DEGREES

## 2024-05-11 NOTE — PATIENT INSTRUCTIONS
Toujeo 10 units ---increase to 14 units          trulicity 0.75 mg weekly  , consider increasing but nauseous , has migraines that aggravates nausea              Synjardy makes sick had to stop       Try Actos 15 mg daily due to fatty liver     Watch fluid accumulation       Jardiance 10 mg once daily       Humalog sliding scale for rescue    Blood sugar   Insulin     200-250  2 units  251-300  3 units  301-350  4 units   Above 351  5 units        Stop glipizide and januiva            4

## 2024-06-24 ENCOUNTER — PATIENT ROUNDING (BHMG ONLY) (OUTPATIENT)
Dept: URGENT CARE | Facility: CLINIC | Age: 49
End: 2024-06-24
Payer: COMMERCIAL

## 2024-06-24 NOTE — ED NOTES
Thank you for letting us care for you in your recent visit to our urgent care center. We would love to hear about your experience with us. Was this the first time you have visited our location?    We’re always looking for ways to make our patients’ experiences even better. Do you have any recommendations on ways we may improve?     I appreciate you taking the time to respond. Please be on the lookout for a survey about your recent visit from [employer] via text or email. We would greatly appreciate if you could fill that out and turn it back in. We want your voice to be heard and we value your feedback.   Thank you for choosing [facility] for your healthcare needs.

## 2025-07-11 ENCOUNTER — TRANSCRIBE ORDERS (OUTPATIENT)
Dept: ADMINISTRATIVE | Facility: HOSPITAL | Age: 50
End: 2025-07-11
Payer: COMMERCIAL

## 2025-07-11 DIAGNOSIS — R07.9 CHEST PAIN, UNSPECIFIED TYPE: Primary | ICD-10-CM

## (undated) DEVICE — 3M™ STERI-STRIP™ REINFORCED ADHESIVE SKIN CLOSURES, R1547, 1/2 IN X 4 IN (12 MM X 100 MM), 6 STRIPS/ENVELOPE: Brand: 3M™ STERI-STRIP™

## (undated) DEVICE — PENCL ES MEGADINE EZ/CLEAN BUTN W/HOLSTR 10FT

## (undated) DEVICE — ENDOPATH XCEL DILATING TIP TROCARS WITH STABILITY SLEEVES: Brand: ENDOPATH XCEL

## (undated) DEVICE — VISUALIZATION SYSTEM: Brand: CLEARIFY

## (undated) DEVICE — ENDOPATH XCEL BLADELESS TROCARS WITH STABILITY SLEEVES: Brand: ENDOPATH XCEL

## (undated) DEVICE — UNDYED BRAIDED (POLYGLACTIN 910), SYNTHETIC ABSORBABLE SUTURE: Brand: COATED VICRYL

## (undated) DEVICE — CORE TRUMPET FOR SINGLE SOLUTION BAG: Brand: CORE DYNAMICS

## (undated) DEVICE — GLV SURG SENSICARE PI ORTHO SZ7.5 LF STRL

## (undated) DEVICE — PK LAP CHOLE LF 60

## (undated) DEVICE — ENDOPOUCH RETRIEVER SPECIMEN RETRIEVAL BAGS: Brand: ENDOPOUCH RETRIEVER

## (undated) DEVICE — THE ECHELON FLEX POWERED PLUS ARTICULATING ENDOSCOPIC LINEAR CUTTERS ARE STERILE, SINGLE PATIENT USE INSTRUMENTS THAT SIMULTANEOUSLYCUT AND STAPLE TISSUE. THERE ARE SIX STAGGERED ROWS OF STAPLES, THREE ON EITHER SIDE OF THE CUT LINE. THE ECHELON FLEX 45 POWERED PLUSINSTRUMENTS HAVE A STAPLE LINE THAT IS APPROXIMATELY 45 MM LONG AND A CUT LINE THAT IS APPROXIMATELY 42 MM LONG. THE SHAFT CAN ROTATE FREELYIN BOTH DIRECTIONS AND AN ARTICULATION MECHANISM ENABLES THE DISTAL PORTION OF THE SHAFT TO PIVOT TO FACILITATE LATERAL ACCESS TO THE OPERATIVESITE.THE INSTRUMENTS ARE PACKAGED WITH A PRIMARY LITHIUM BATTERY PACK THAT MUST BE INSTALLED PRIOR TO USE. THERE ARE SPECIFIC REQUIREMENTS FORDISPOSING OF THE BATTERY PACK. REFER TO THE BATTERY PACK DISPOSAL SECTION.THE INSTRUMENTS ARE PACKAGED WITHOUT A RELOAD AND MUST BE LOADED PRIOR TO USE. A STAPLE RETAINING CAP ON THE RELOAD PROTECTS THE STAPLE LEGPOINTS DURING SHIPPING AND TRANSPORTATION. THE INSTRUMENTS’ LOCK-OUT FEATURE IS DESIGNED TO PREVENT A USED OR IMPROPERLY INSTALLED RELOADFROM BEING REFIRED OR AN INSTRUMENT FROM BEING FIRED WITHOUT A RELOAD.: Brand: ECHELON FLEX

## (undated) DEVICE — SYS CLS PORTSITE CT CLOSESURE 5AND10/12

## (undated) DEVICE — LAP WIRE L HOOK SS 34.4CM: Brand: MEDLINE INDUSTRIES, INC.

## (undated) DEVICE — STERILE POLYISOPRENE POWDER-FREE SURGICAL GLOVES WITH EMOLLIENT COATING: Brand: PROTEXIS

## (undated) DEVICE — GLV SURG SENSICARE PI LF PF 7.5 GRN STRL

## (undated) DEVICE — SOL IRR NACL 0.9PCT BT 1000ML

## (undated) DEVICE — SUT VIC 0 CT1 36IN J946H

## (undated) DEVICE — SOL IRRIG NACL 1000ML

## (undated) DEVICE — MONOPOLAR METZENBAUM SCISSOR TIP, DISPOSABLE: Brand: MONOPOLAR METZENBAUM SCISSOR TIP, DISPOSABLE

## (undated) DEVICE — ADHS LIQ MASTISOL 2/3ML